# Patient Record
Sex: FEMALE | Race: BLACK OR AFRICAN AMERICAN | Employment: FULL TIME | URBAN - METROPOLITAN AREA
[De-identification: names, ages, dates, MRNs, and addresses within clinical notes are randomized per-mention and may not be internally consistent; named-entity substitution may affect disease eponyms.]

---

## 2019-01-27 ENCOUNTER — HOSPITAL ENCOUNTER (EMERGENCY)
Age: 39
Discharge: HOME | End: 2019-01-27
Attending: EMERGENCY MEDICINE
Payer: COMMERCIAL

## 2019-01-27 ENCOUNTER — APPOINTMENT (OUTPATIENT)
Dept: CT IMAGING | Age: 39
End: 2019-01-27
Attending: EMERGENCY MEDICINE
Payer: COMMERCIAL

## 2019-01-27 ENCOUNTER — APPOINTMENT (OUTPATIENT)
Dept: GENERAL RADIOLOGY | Age: 39
End: 2019-01-27
Attending: EMERGENCY MEDICINE
Payer: COMMERCIAL

## 2019-01-27 VITALS
SYSTOLIC BLOOD PRESSURE: 135 MMHG | WEIGHT: 291.9 LBS | RESPIRATION RATE: 20 BRPM | DIASTOLIC BLOOD PRESSURE: 80 MMHG | TEMPERATURE: 98.7 F | OXYGEN SATURATION: 96 % | HEART RATE: 105 BPM | HEIGHT: 64 IN | BODY MASS INDEX: 49.83 KG/M2

## 2019-01-27 DIAGNOSIS — J20.9 ACUTE BRONCHITIS, UNSPECIFIED ORGANISM: Primary | ICD-10-CM

## 2019-01-27 LAB
ALBUMIN SERPL-MCNC: 3.5 G/DL (ref 3.5–5)
ALBUMIN/GLOB SERPL: 0.8 {RATIO}
ALP SERPL-CCNC: 85 U/L (ref 50–130)
ALT SERPL-CCNC: 34 U/L (ref 12–65)
ANION GAP SERPL CALC-SCNC: 8 MMOL/L
AST SERPL-CCNC: 24 U/L (ref 15–37)
BASOPHILS # BLD: 0 K/UL (ref 0–0.2)
BASOPHILS NFR BLD: 0 % (ref 0–2)
BILIRUB SERPL-MCNC: 0.1 MG/DL (ref 0.2–1.1)
BUN SERPL-MCNC: 4 MG/DL (ref 6–23)
CALCIUM SERPL-MCNC: 9.1 MG/DL (ref 8.3–10.4)
CHLORIDE SERPL-SCNC: 106 MMOL/L (ref 98–107)
CO2 SERPL-SCNC: 28 MMOL/L (ref 21–32)
CREAT SERPL-MCNC: 0.81 MG/DL (ref 0.6–1)
DIFFERENTIAL METHOD BLD: ABNORMAL
EOSINOPHIL # BLD: 0.2 K/UL (ref 0–0.8)
EOSINOPHIL NFR BLD: 2 % (ref 0.5–7.8)
ERYTHROCYTE [DISTWIDTH] IN BLOOD BY AUTOMATED COUNT: 14 % (ref 11.9–14.6)
FLUAV AG NPH QL IA: NEGATIVE
FLUBV AG NPH QL IA: NEGATIVE
GLOBULIN SER CALC-MCNC: 4.5 G/DL (ref 2.3–3.5)
GLUCOSE SERPL-MCNC: 109 MG/DL (ref 65–100)
HCT VFR BLD AUTO: 40.9 % (ref 35.8–46.3)
HGB BLD-MCNC: 13.4 G/DL (ref 11.7–15.4)
IMM GRANULOCYTES # BLD AUTO: 0 K/UL (ref 0–0.5)
IMM GRANULOCYTES NFR BLD AUTO: 0 % (ref 0–5)
LYMPHOCYTES # BLD: 0.7 K/UL (ref 0.5–4.6)
LYMPHOCYTES NFR BLD: 8 % (ref 13–44)
MCH RBC QN AUTO: 27.1 PG (ref 26.1–32.9)
MCHC RBC AUTO-ENTMCNC: 32.8 G/DL (ref 31.4–35)
MCV RBC AUTO: 82.6 FL (ref 79.6–97.8)
MONOCYTES # BLD: 0.9 K/UL (ref 0.1–1.3)
MONOCYTES NFR BLD: 11 % (ref 4–12)
NEUTS SEG # BLD: 6.9 K/UL (ref 1.7–8.2)
NEUTS SEG NFR BLD: 80 % (ref 43–78)
NRBC # BLD: 0 K/UL (ref 0–0.2)
PLATELET # BLD AUTO: 282 K/UL (ref 150–450)
PMV BLD AUTO: 11.2 FL (ref 9.4–12.3)
POTASSIUM SERPL-SCNC: 4 MMOL/L (ref 3.5–5.1)
PROT SERPL-MCNC: 8 G/DL
RBC # BLD AUTO: 4.95 M/UL (ref 4.05–5.2)
SODIUM SERPL-SCNC: 142 MMOL/L (ref 136–145)
SPECIMEN SOURCE: NORMAL
WBC # BLD AUTO: 8.6 K/UL (ref 4.3–11.1)

## 2019-01-27 PROCEDURE — 74011000250 HC RX REV CODE- 250: Performed by: EMERGENCY MEDICINE

## 2019-01-27 PROCEDURE — 80053 COMPREHEN METABOLIC PANEL: CPT

## 2019-01-27 PROCEDURE — 71045 X-RAY EXAM CHEST 1 VIEW: CPT

## 2019-01-27 PROCEDURE — 77030013140 HC MSK NEB VYRM -A

## 2019-01-27 PROCEDURE — 74011636320 HC RX REV CODE- 636/320: Performed by: EMERGENCY MEDICINE

## 2019-01-27 PROCEDURE — 87804 INFLUENZA ASSAY W/OPTIC: CPT

## 2019-01-27 PROCEDURE — 96374 THER/PROPH/DIAG INJ IV PUSH: CPT | Performed by: EMERGENCY MEDICINE

## 2019-01-27 PROCEDURE — 93005 ELECTROCARDIOGRAM TRACING: CPT | Performed by: EMERGENCY MEDICINE

## 2019-01-27 PROCEDURE — 96375 TX/PRO/DX INJ NEW DRUG ADDON: CPT | Performed by: EMERGENCY MEDICINE

## 2019-01-27 PROCEDURE — 99284 EMERGENCY DEPT VISIT MOD MDM: CPT | Performed by: EMERGENCY MEDICINE

## 2019-01-27 PROCEDURE — 74011000258 HC RX REV CODE- 258: Performed by: EMERGENCY MEDICINE

## 2019-01-27 PROCEDURE — 74011250636 HC RX REV CODE- 250/636: Performed by: EMERGENCY MEDICINE

## 2019-01-27 PROCEDURE — 74011250637 HC RX REV CODE- 250/637: Performed by: EMERGENCY MEDICINE

## 2019-01-27 PROCEDURE — 85025 COMPLETE CBC W/AUTO DIFF WBC: CPT

## 2019-01-27 PROCEDURE — 71260 CT THORAX DX C+: CPT

## 2019-01-27 PROCEDURE — 94640 AIRWAY INHALATION TREATMENT: CPT

## 2019-01-27 PROCEDURE — 77030012341 HC CHMB SPCR OPTC MDI VYRM -A

## 2019-01-27 RX ORDER — ACETAMINOPHEN 500 MG
1000 TABLET ORAL
Status: COMPLETED | OUTPATIENT
Start: 2019-01-27 | End: 2019-01-27

## 2019-01-27 RX ORDER — MORPHINE SULFATE 2 MG/ML
2 INJECTION, SOLUTION INTRAMUSCULAR; INTRAVENOUS
Status: DISCONTINUED | OUTPATIENT
Start: 2019-01-27 | End: 2019-01-27

## 2019-01-27 RX ORDER — SODIUM CHLORIDE 0.9 % (FLUSH) 0.9 %
10 SYRINGE (ML) INJECTION
Status: COMPLETED | OUTPATIENT
Start: 2019-01-27 | End: 2019-01-27

## 2019-01-27 RX ORDER — MORPHINE SULFATE 10 MG/ML
2 INJECTION, SOLUTION INTRAMUSCULAR; INTRAVENOUS
Status: COMPLETED | OUTPATIENT
Start: 2019-01-27 | End: 2019-01-27

## 2019-01-27 RX ORDER — IPRATROPIUM BROMIDE AND ALBUTEROL SULFATE 2.5; .5 MG/3ML; MG/3ML
3 SOLUTION RESPIRATORY (INHALATION)
Status: COMPLETED | OUTPATIENT
Start: 2019-01-27 | End: 2019-01-27

## 2019-01-27 RX ORDER — KETOROLAC TROMETHAMINE 30 MG/ML
30 INJECTION, SOLUTION INTRAMUSCULAR; INTRAVENOUS
Status: COMPLETED | OUTPATIENT
Start: 2019-01-27 | End: 2019-01-27

## 2019-01-27 RX ADMIN — IOPAMIDOL 100 ML: 755 INJECTION, SOLUTION INTRAVENOUS at 22:49

## 2019-01-27 RX ADMIN — MORPHINE SULFATE 2 MG: 10 INJECTION INTRAVENOUS at 23:05

## 2019-01-27 RX ADMIN — SODIUM CHLORIDE 1000 ML: 900 INJECTION, SOLUTION INTRAVENOUS at 20:52

## 2019-01-27 RX ADMIN — ACETAMINOPHEN 1000 MG: 500 TABLET, FILM COATED ORAL at 20:52

## 2019-01-27 RX ADMIN — Medication 10 ML: at 22:49

## 2019-01-27 RX ADMIN — SODIUM CHLORIDE 100 ML: 900 INJECTION, SOLUTION INTRAVENOUS at 22:49

## 2019-01-27 RX ADMIN — IPRATROPIUM BROMIDE AND ALBUTEROL SULFATE 3 ML: .5; 3 SOLUTION RESPIRATORY (INHALATION) at 19:58

## 2019-01-27 RX ADMIN — KETOROLAC TROMETHAMINE 30 MG: 30 INJECTION, SOLUTION INTRAMUSCULAR at 23:05

## 2019-01-27 NOTE — LETTER
[address] [facility] EMERGENCY DEPT 
[facility] [address] [facility] [address] 
[phone] Work/School Note Date: 1/27/2019 To Whom It May concern: 
 
Terry Wan was seen and treated today in the emergency room by the following provider(s): 
No providers found. Terry Wan may return to work on 01/29/2019.  
 
Sincerely, 
 
 
 
 
Casper Matson

## 2019-01-28 LAB
ATRIAL RATE: 137 BPM
CALCULATED P AXIS, ECG09: 52 DEGREES
CALCULATED R AXIS, ECG10: 32 DEGREES
CALCULATED T AXIS, ECG11: 46 DEGREES
DIAGNOSIS, 93000: NORMAL
P-R INTERVAL, ECG05: 126 MS
Q-T INTERVAL, ECG07: 292 MS
QRS DURATION, ECG06: 68 MS
QTC CALCULATION (BEZET), ECG08: 440 MS
VENTRICULAR RATE, ECG03: 137 BPM

## 2019-01-28 NOTE — DISCHARGE INSTRUCTIONS
Take your medications as prescribed  Alternate between Tylenol 1000 mg and ibuprofen 800 mg every 4 hours as needed for fever  Drink plenty of fluids  Follow-up with your primary care physician    Bronchitis: Care Instructions  Your Care Instructions    Bronchitis is inflammation of the bronchial tubes, which carry air to the lungs. The tubes swell and produce mucus, or phlegm. The mucus and inflamed bronchial tubes make you cough. You may have trouble breathing. Most cases of bronchitis are caused by viruses like those that cause colds. Antibiotics usually do not help and they may be harmful. Bronchitis usually develops rapidly and lasts about 2 to 3 weeks in otherwise healthy people. Follow-up care is a key part of your treatment and safety. Be sure to make and go to all appointments, and call your doctor if you are having problems. It's also a good idea to know your test results and keep a list of the medicines you take. How can you care for yourself at home? · Take all medicines exactly as prescribed. Call your doctor if you think you are having a problem with your medicine. · Get some extra rest.  · Take an over-the-counter pain medicine, such as acetaminophen (Tylenol), ibuprofen (Advil, Motrin), or naproxen (Aleve) to reduce fever and relieve body aches. Read and follow all instructions on the label. · Do not take two or more pain medicines at the same time unless the doctor told you to. Many pain medicines have acetaminophen, which is Tylenol. Too much acetaminophen (Tylenol) can be harmful. · Take an over-the-counter cough medicine that contains dextromethorphan to help quiet a dry, hacking cough so that you can sleep. Avoid cough medicines that have more than one active ingredient. Read and follow all instructions on the label. · Breathe moist air from a humidifier, hot shower, or sink filled with hot water. The heat and moisture will thin mucus so you can cough it out. · Do not smoke. Smoking can make bronchitis worse. If you need help quitting, talk to your doctor about stop-smoking programs and medicines. These can increase your chances of quitting for good. When should you call for help? Call 911 anytime you think you may need emergency care. For example, call if:    · You have severe trouble breathing.    Call your doctor now or seek immediate medical care if:    · You have new or worse trouble breathing.     · You cough up dark brown or bloody mucus (sputum).     · You have a new or higher fever.     · You have a new rash.    Watch closely for changes in your health, and be sure to contact your doctor if:    · You cough more deeply or more often, especially if you notice more mucus or a change in the color of your mucus.     · You are not getting better as expected. Where can you learn more? Go to http://anil-cici.info/. Enter H333 in the search box to learn more about \"Bronchitis: Care Instructions. \"  Current as of: September 5, 2018  Content Version: 11.9  © 3331-0908 [employer]. Care instructions adapted under license by [employer] (which disclaims liability or warranty for this information). If you have questions about a medical condition or this instruction, always ask your healthcare professional. [address] any warranty or liability for your use of this information.

## 2019-01-28 NOTE — ED PROVIDER NOTES
Patient presents to ER complaining of fevers, cough, shortness of breath. Symptoms started earlier last evening. Reports she was seen at urgent care today and diagnosed with bronchitis. She has no history of asthma. Reports symptoms worsened throughout today to include fevers and chills. She denies any vomiting The history is provided by the patient. Shortness of Breath This is a new problem. The problem occurs frequently. The current episode started yesterday. Associated symptoms include a fever and cough. Pertinent negatives include no headaches, no sputum production, no hemoptysis, no chest pain, no vomiting, no abdominal pain, no rash and no leg pain. No past medical history on file. No past surgical history on file. No family history on file. Social History Socioeconomic History  Marital status: SINGLE Spouse name: Not on file  Number of children: Not on file  Years of education: Not on file  Highest education level: Not on file Social Needs  Financial resource strain: Not on file  Food insecurity - worry: Not on file  Food insecurity - inability: Not on file  Transportation needs - medical: Not on file  Transportation needs - non-medical: Not on file Occupational History  Not on file Tobacco Use  Smoking status: Not on file Substance and Sexual Activity  Alcohol use: Not on file  Drug use: Not on file  Sexual activity: Not on file Other Topics Concern  Not on file Social History Narrative  Not on file ALLERGIES: Latex Review of Systems Constitutional: Positive for fever. HENT: Positive for congestion. Eyes: Negative for photophobia, redness and visual disturbance. Respiratory: Positive for cough and shortness of breath. Negative for hemoptysis, sputum production and chest tightness. Cardiovascular: Negative for chest pain. Gastrointestinal: Negative for abdominal pain, nausea and vomiting. Endocrine: Negative for polydipsia, polyphagia and polyuria. Genitourinary: Negative for flank pain, frequency and urgency. Musculoskeletal: Negative for back pain. Skin: Negative for rash. Neurological: Positive for weakness. Negative for seizures, syncope, numbness and headaches. Psychiatric/Behavioral: Negative for behavioral problems and confusion. All other systems reviewed and are negative. Vitals:  
 01/27/19 1941 01/27/19 1958 BP: 137/87 Pulse: (!) 144 Resp: 26 Temp: 100.4 °F (38 °C) SpO2: 95% 95% Weight: 132.4 kg (291 lb 14.4 oz) Height: 5' 4\" (1.626 m) Physical Exam  
Constitutional: She is oriented to person, place, and time. She appears well-developed and well-nourished. HENT:  
Head: Normocephalic and atraumatic. Eyes: Conjunctivae and EOM are normal. Pupils are equal, round, and reactive to light. Neck: Normal range of motion. Neck supple. No tracheal deviation present. No thyromegaly present. Cardiovascular: Normal heart sounds. Tachycardia present. Pulmonary/Chest: Effort normal. She has wheezes. Abdominal: Soft. Bowel sounds are normal.  
Musculoskeletal: Normal range of motion. She exhibits no edema or deformity. Neurological: She is alert and oriented to person, place, and time. No cranial nerve deficit. Nursing note and vitals reviewed. MDM Number of Diagnoses or Management Options Diagnosis management comments: We'll obtain chest x-ray, obtain basic labs 10:14 PM 
Flu swab negative. Chest x-ray read as normal.  Patient still with some tachycardia here as well as some mild hypoxia. We'll obtain CT scan of chest to rule out PE 
 
11:02 PM 
CT of chest shows no acute abnormalities. Discussed with patient. Results of testing. We'll have her continue her medications as prescribed Amount and/or Complexity of Data Reviewed Clinical lab tests: ordered and reviewed Tests in the radiology section of CPT®: ordered and reviewed Risk of Complications, Morbidity, and/or Mortality Presenting problems: high Diagnostic procedures: moderate Management options: moderate Patient Progress Patient progress: stable Procedures Results Include: 
 
Recent Results (from the past 24 hour(s)) INFLUENZA A & B AG (RAPID TEST) Collection Time: 01/27/19  7:48 PM  
Result Value Ref Range Influenza A Ag NEGATIVE  NEG Influenza B Ag NEGATIVE  NEG Source NASOPHARYNGEAL    
CBC WITH AUTOMATED DIFF Collection Time: 01/27/19  8:01 PM  
Result Value Ref Range WBC 8.6 4.3 - 11.1 K/uL  
 RBC 4.95 4.05 - 5.2 M/uL  
 HGB 13.4 11.7 - 15.4 g/dL HCT 40.9 35.8 - 46.3 % MCV 82.6 79.6 - 97.8 FL  
 MCH 27.1 26.1 - 32.9 PG  
 MCHC 32.8 31.4 - 35.0 g/dL  
 RDW 14.0 11.9 - 14.6 % PLATELET 738 861 - 782 K/uL MPV 11.2 9.4 - 12.3 FL ABSOLUTE NRBC 0.00 0.0 - 0.2 K/uL  
 DF AUTOMATED NEUTROPHILS 80 (H) 43 - 78 % LYMPHOCYTES 8 (L) 13 - 44 % MONOCYTES 11 4.0 - 12.0 % EOSINOPHILS 2 0.5 - 7.8 % BASOPHILS 0 0.0 - 2.0 % IMMATURE GRANULOCYTES 0 0.0 - 5.0 %  
 ABS. NEUTROPHILS 6.9 1.7 - 8.2 K/UL  
 ABS. LYMPHOCYTES 0.7 0.5 - 4.6 K/UL  
 ABS. MONOCYTES 0.9 0.1 - 1.3 K/UL  
 ABS. EOSINOPHILS 0.2 0.0 - 0.8 K/UL  
 ABS. BASOPHILS 0.0 0.0 - 0.2 K/UL  
 ABS. IMM. GRANS. 0.0 0.0 - 0.5 K/UL METABOLIC PANEL, COMPREHENSIVE Collection Time: 01/27/19  8:01 PM  
Result Value Ref Range Sodium 142 136 - 145 mmol/L Potassium 4.0 3.5 - 5.1 mmol/L Chloride 106 98 - 107 mmol/L  
 CO2 28 21 - 32 mmol/L Anion gap 8 mmol/L Glucose 109 (H) 65 - 100 mg/dL BUN 4 (L) 6 - 23 MG/DL Creatinine 0.81 0.6 - 1.0 MG/DL  
 GFR est AA >60 >60 ml/min/1.73m2 GFR est non-AA >60 ml/min/1.73m2 Calcium 9.1 8.3 - 10.4 MG/DL  Bilirubin, total 0.1 (L) 0.2 - 1.1 MG/DL  
 ALT (SGPT) 34 12 - 65 U/L  
 AST (SGOT) 24 15 - 37 U/L Alk. phosphatase 85 50 - 130 U/L Protein, total 8.0 g/dL Albumin 3.5 3.5 - 5.0 g/dL Globulin 4.5 (H) 2.3 - 3.5 g/dL A-G Ratio 0.8 Voice dictation software was used during the making of this note. This software is not perfect and grammatical and other typographical errors may be present. This note has been proofread, but may still contain errors.  
Renae Mazariegos MD; 1/27/2019 @11:02 PM  
===================================================================

## 2019-04-12 VITALS — HEIGHT: 66 IN | BODY MASS INDEX: 47.09 KG/M2 | WEIGHT: 293 LBS

## 2019-04-12 NOTE — H&P (VIEW-ONLY)
History and Physical 
 
Patient: Wily Lopez MRN: [id number]  SSN: [id number] YOB: 1980  Age: 43 y.o. Sex: female Vital Signs: /87 P122 R20 SPO2 96% Visit Vitals Ht 5' 6\" (1.676 m) Wt 133.8 kg (295 lb) BMI 47.61 kg/m² Allergies: Allergies Allergen Reactions  Latex Hives Chief Complaint: back, neck, and shoulder pain History of Present Illness: back, neck, and shoulder pain Justification for Procedure: back, neck, and shoulder pain History: 
Past Medical History:  
Diagnosis Date  Arthritis   
 osteo  Asthma   
 last attack 10/2018  Chronic pain  GERD (gastroesophageal reflux disease)   
 controlled with medication  Insomnia  Low back pain  Migraines  Morbid obesity ([address]  Otitis media  Sleep apnea   
 does not use cpap Past Surgical History:  
Procedure Laterality Date  HX CHOLECYSTECTOMY  2015  HX CATHY AND BSO  2016  HX WISDOM TEETH EXTRACTION Social History Socioeconomic History  Marital status: SINGLE Spouse name: Not on file  Number of children: Not on file  Years of education: Not on file  Highest education level: Not on file Tobacco Use  Smoking status: Never Smoker  Smokeless tobacco: Never Used Substance and Sexual Activity  Alcohol use: Yes Alcohol/week: 0.6 oz Types: 1 Glasses of wine per week  Drug use: Never Family History Problem Relation Age of Onset  Heart Attack Father  Cancer Sister   
     brain ca  No Known Problems Brother  No Known Problems Sister  No Known Problems Brother Medications:  
Prior to Admission medications Medication Sig Start Date End Date Taking? Authorizing Provider  
eletriptan hydrobromide (RELPAX PO) Take  by mouth. Provider, Historical  
ketorolac (TORADOL) 10 mg tablet Take  by mouth as needed for Pain.     Provider, Historical  
 montelukast (SINGULAIR) 10 mg tablet Take 10 mg by mouth daily. Provider, Historical  
albuterol (VENTOLIN HFA) 90 mcg/actuation inhaler Take  by inhalation. Provider, Historical  
meloxicam (MOBIC) 15 mg tablet Take 15 mg by mouth daily. Provider, Historical  
cyclobenzaprine (FLEXERIL) 10 mg tablet Take  by mouth three (3) times daily as needed for Muscle Spasm(s). Provider, Historical  
Cetirizine (ZYRTEC) 10 mg cap Take  by mouth. Provider, Historical  
albuterol sulfate (PROVENTIL;VENTOLIN) 2.5 mg/0.5 mL nebu nebulizer solution by Nebulization route once. Provider, Historical  
fluticasone propionate (FLOVENT HFA) 220 mcg/actuation inhaler Take  by inhalation. Provider, Historical  
Omeprazole delayed release (PRILOSEC D/R) 20 mg tablet Take 20 mg by mouth daily. Provider, Historical  
traZODone (DESYREL) 50 mg tablet Take  by mouth nightly. Provider, Historical  
 
 
Physical Exam:  
Heart: regular rate and rhythm, S1, S2 normal, no murmur, click, rub or gallop Lungs: clear to auscultation bilaterally Surgical Site: bilateral breasts Findings/Diagnosis: macromastia Plan of Care/Planned Procedure: bilateral breast reduction Signed By: Thor Simon MD  
 April 12, 2019

## 2019-04-12 NOTE — H&P
History and Physical    Patient: Wily Lopez MRN: [id number]  SSN: [id number]    YOB: 1980  Age: [id number] [facility] y.o. Sex: female      Vital Signs: /87 P122 R20 SPO2 96%  Visit Vitals  Ht 5' 6\" (1.676 m)   Wt 133.8 kg (295 lb)   BMI 47.61 kg/m²        Allergies: Allergies   Allergen Reactions    Latex Hives       Chief Complaint: back, neck, and shoulder pain     History of Present Illness: back, neck, and shoulder pain     Justification for Procedure: back, neck, and shoulder pain     History:  Past Medical History:   Diagnosis Date    Arthritis     osteo    Asthma     last attack 10/2018    Chronic pain     GERD (gastroesophageal reflux disease)     controlled with medication    Insomnia     Low back pain     Migraines     Morbid obesity ([address]     Otitis media     Sleep apnea     does not use cpap      Past Surgical History:   Procedure Laterality Date    HX CHOLECYSTECTOMY  2015    HX CATHY AND BSO  2016    HX WISDOM TEETH EXTRACTION        Social History     Socioeconomic History    Marital status: SINGLE     Spouse name: Not on file    Number of children: Not on file    Years of education: Not on file    Highest education level: Not on file   Tobacco Use    Smoking status: Never Smoker    Smokeless tobacco: Never Used   Substance and Sexual Activity    Alcohol use: Yes     Alcohol/week: 0.6 oz     Types: 1 Glasses of wine per week    Drug use: Never      Family History   Problem Relation Age of Onset    Heart Attack Father     Cancer Sister         brain ca    No Known Problems Brother     No Known Problems Sister     No Known Problems Brother        Medications:   Prior to Admission medications    Medication Sig Start Date End Date Taking? Authorizing Provider   eletriptan hydrobromide (RELPAX PO) Take  by mouth. Provider, Historical   ketorolac (TORADOL) 10 mg tablet Take  by mouth as needed for Pain.     Provider, Historical   montelukast (SINGULAIR) 10 mg tablet Take 10 mg by mouth daily. Provider, Historical   albuterol (VENTOLIN HFA) 90 mcg/actuation inhaler Take  by inhalation. Provider, Historical   meloxicam (MOBIC) 15 mg tablet Take 15 mg by mouth daily. Provider, Historical   cyclobenzaprine (FLEXERIL) 10 mg tablet Take  by mouth three (3) times daily as needed for Muscle Spasm(s). Provider, Historical   Cetirizine (ZYRTEC) 10 mg cap Take  by mouth. Provider, Historical   albuterol sulfate (PROVENTIL;VENTOLIN) 2.5 mg/0.5 mL nebu nebulizer solution by Nebulization route once. Provider, Historical   fluticasone propionate (FLOVENT HFA) 220 mcg/actuation inhaler Take  by inhalation. Provider, Historical   Omeprazole delayed release (PRILOSEC D/R) 20 mg tablet Take 20 mg by mouth daily. Provider, Historical   traZODone (DESYREL) 50 mg tablet Take  by mouth nightly.     Provider, Historical       Physical Exam:   Heart: regular rate and rhythm, S1, S2 normal, no murmur, click, rub or gallop   Lungs: clear to auscultation bilaterally   Surgical Site: bilateral breasts     Findings/Diagnosis: macromastia    Plan of Care/Planned Procedure: bilateral breast reduction    Signed By: Raheem Bowser MD    April 12, 2019

## 2019-04-15 ENCOUNTER — ANESTHESIA (OUTPATIENT)
Dept: SURGERY | Age: 39
End: 2019-04-15
Payer: COMMERCIAL

## 2019-04-15 ENCOUNTER — ANESTHESIA EVENT (OUTPATIENT)
Dept: SURGERY | Age: 39
End: 2019-04-15
Payer: COMMERCIAL

## 2019-04-15 ENCOUNTER — HOSPITAL ENCOUNTER (OUTPATIENT)
Age: 39
Setting detail: OUTPATIENT SURGERY
Discharge: HOME | End: 2019-04-15
Attending: SURGERY | Admitting: SURGERY
Payer: COMMERCIAL

## 2019-04-15 VITALS
BODY MASS INDEX: 48.7 KG/M2 | TEMPERATURE: 98 F | HEART RATE: 103 BPM | SYSTOLIC BLOOD PRESSURE: 116 MMHG | WEIGHT: 293 LBS | RESPIRATION RATE: 16 BRPM | OXYGEN SATURATION: 97 % | DIASTOLIC BLOOD PRESSURE: 76 MMHG

## 2019-04-15 DIAGNOSIS — G89.18 POST-OP PAIN: Primary | ICD-10-CM

## 2019-04-15 LAB — HGB BLD-MCNC: 13 G/DL (ref 11.7–15.4)

## 2019-04-15 PROCEDURE — 74011250636 HC RX REV CODE- 250/636: Performed by: SURGERY

## 2019-04-15 PROCEDURE — 76210000063 HC OR PH I REC FIRST 0.5 HR: Performed by: SURGERY

## 2019-04-15 PROCEDURE — 77030019908 HC STETH ESOPH SIMS -A: Performed by: ANESTHESIOLOGY

## 2019-04-15 PROCEDURE — 77030020782 HC GWN BAIR PAWS FLX 3M -B: Performed by: ANESTHESIOLOGY

## 2019-04-15 PROCEDURE — 74011250637 HC RX REV CODE- 250/637: Performed by: ANESTHESIOLOGY

## 2019-04-15 PROCEDURE — 77030002991 HC SUT QUILL SSPC -B: Performed by: SURGERY

## 2019-04-15 PROCEDURE — 76210000020 HC REC RM PH II FIRST 0.5 HR: Performed by: SURGERY

## 2019-04-15 PROCEDURE — 74011250636 HC RX REV CODE- 250/636

## 2019-04-15 PROCEDURE — 77030018836 HC SOL IRR NACL ICUM -A: Performed by: SURGERY

## 2019-04-15 PROCEDURE — 77030028700 HC BLD TISS PLSM MEDT -E: Performed by: SURGERY

## 2019-04-15 PROCEDURE — 77030033138 HC SUT PGA STRATFX J&J -B: Performed by: SURGERY

## 2019-04-15 PROCEDURE — 77030028234 HC DEV PEAK PLSMBLD MEDT -B: Performed by: SURGERY

## 2019-04-15 PROCEDURE — 77030002933 HC SUT MCRYL J&J -A: Performed by: SURGERY

## 2019-04-15 PROCEDURE — 77030008467 HC STPLR SKN COVD -B: Performed by: SURGERY

## 2019-04-15 PROCEDURE — 77030039425 HC BLD LARYNG TRULITE DISP TELE -A: Performed by: ANESTHESIOLOGY

## 2019-04-15 PROCEDURE — 76010000173 HC OR TIME 3 TO 3.5 HR INTENSV-TIER 1: Performed by: SURGERY

## 2019-04-15 PROCEDURE — 74011250636 HC RX REV CODE- 250/636: Performed by: ANESTHESIOLOGY

## 2019-04-15 PROCEDURE — 77030011283 HC ELECTRD NDL COVD -A: Performed by: SURGERY

## 2019-04-15 PROCEDURE — 77030037088 HC TUBE ENDOTRACH ORAL NSL COVD-A: Performed by: ANESTHESIOLOGY

## 2019-04-15 PROCEDURE — 74011000250 HC RX REV CODE- 250

## 2019-04-15 PROCEDURE — 88307 TISSUE EXAM BY PATHOLOGIST: CPT

## 2019-04-15 PROCEDURE — 77030002986 HC SUT PROL J&J -A: Performed by: SURGERY

## 2019-04-15 PROCEDURE — 77030032490 HC SLV COMPR SCD KNE COVD -B: Performed by: SURGERY

## 2019-04-15 PROCEDURE — 77030006625 HC BLD GRFT DRMTO INLC -C: Performed by: SURGERY

## 2019-04-15 PROCEDURE — 77030033067 HC SUT PDO STRATFX SPIR J&J -B: Performed by: SURGERY

## 2019-04-15 PROCEDURE — 85018 HEMOGLOBIN: CPT

## 2019-04-15 PROCEDURE — 76060000037 HC ANESTHESIA 3 TO 3.5 HR: Performed by: SURGERY

## 2019-04-15 PROCEDURE — 77030002916 HC SUT ETHLN J&J -A: Performed by: SURGERY

## 2019-04-15 RX ORDER — HYDROCODONE BITARTRATE AND ACETAMINOPHEN 5; 325 MG/1; MG/1
2 TABLET ORAL AS NEEDED
Status: DISCONTINUED | OUTPATIENT
Start: 2019-04-15 | End: 2019-04-15 | Stop reason: HOSPADM

## 2019-04-15 RX ORDER — HYDROMORPHONE HYDROCHLORIDE 2 MG/ML
0.5 INJECTION, SOLUTION INTRAMUSCULAR; INTRAVENOUS; SUBCUTANEOUS
Status: DISCONTINUED | OUTPATIENT
Start: 2019-04-15 | End: 2019-04-15 | Stop reason: HOSPADM

## 2019-04-15 RX ORDER — FENTANYL CITRATE 50 UG/ML
INJECTION, SOLUTION INTRAMUSCULAR; INTRAVENOUS AS NEEDED
Status: DISCONTINUED | OUTPATIENT
Start: 2019-04-15 | End: 2019-04-15 | Stop reason: HOSPADM

## 2019-04-15 RX ORDER — NEOSTIGMINE METHYLSULFATE 1 MG/ML
INJECTION INTRAVENOUS AS NEEDED
Status: DISCONTINUED | OUTPATIENT
Start: 2019-04-15 | End: 2019-04-15 | Stop reason: HOSPADM

## 2019-04-15 RX ORDER — FENTANYL CITRATE 50 UG/ML
100 INJECTION, SOLUTION INTRAMUSCULAR; INTRAVENOUS ONCE
Status: DISCONTINUED | OUTPATIENT
Start: 2019-04-15 | End: 2019-04-15 | Stop reason: HOSPADM

## 2019-04-15 RX ORDER — ONDANSETRON 2 MG/ML
INJECTION INTRAMUSCULAR; INTRAVENOUS AS NEEDED
Status: DISCONTINUED | OUTPATIENT
Start: 2019-04-15 | End: 2019-04-15 | Stop reason: HOSPADM

## 2019-04-15 RX ORDER — OXYCODONE AND ACETAMINOPHEN 10; 325 MG/1; MG/1
1 TABLET ORAL
Qty: 20 TAB | Refills: 0 | Status: SHIPPED | OUTPATIENT
Start: 2019-04-15 | End: 2019-04-18

## 2019-04-15 RX ORDER — PROPOFOL 10 MG/ML
INJECTION, EMULSION INTRAVENOUS AS NEEDED
Status: DISCONTINUED | OUTPATIENT
Start: 2019-04-15 | End: 2019-04-15 | Stop reason: HOSPADM

## 2019-04-15 RX ORDER — DEXAMETHASONE SODIUM PHOSPHATE 4 MG/ML
INJECTION, SOLUTION INTRA-ARTICULAR; INTRALESIONAL; INTRAMUSCULAR; INTRAVENOUS; SOFT TISSUE AS NEEDED
Status: DISCONTINUED | OUTPATIENT
Start: 2019-04-15 | End: 2019-04-15 | Stop reason: HOSPADM

## 2019-04-15 RX ORDER — OXYCODONE HYDROCHLORIDE 5 MG/1
10 TABLET ORAL
Status: COMPLETED | OUTPATIENT
Start: 2019-04-15 | End: 2019-04-15

## 2019-04-15 RX ORDER — LIDOCAINE HYDROCHLORIDE 20 MG/ML
INJECTION, SOLUTION EPIDURAL; INFILTRATION; INTRACAUDAL; PERINEURAL AS NEEDED
Status: DISCONTINUED | OUTPATIENT
Start: 2019-04-15 | End: 2019-04-15 | Stop reason: HOSPADM

## 2019-04-15 RX ORDER — SODIUM CHLORIDE, SODIUM LACTATE, POTASSIUM CHLORIDE, CALCIUM CHLORIDE 600; 310; 30; 20 MG/100ML; MG/100ML; MG/100ML; MG/100ML
75 INJECTION, SOLUTION INTRAVENOUS CONTINUOUS
Status: DISCONTINUED | OUTPATIENT
Start: 2019-04-15 | End: 2019-04-15 | Stop reason: HOSPADM

## 2019-04-15 RX ORDER — HYDROMORPHONE HYDROCHLORIDE 2 MG/ML
0.5 INJECTION, SOLUTION INTRAMUSCULAR; INTRAVENOUS; SUBCUTANEOUS
Status: COMPLETED | OUTPATIENT
Start: 2019-04-15 | End: 2019-04-15

## 2019-04-15 RX ORDER — LIDOCAINE HYDROCHLORIDE 10 MG/ML
0.1 INJECTION INFILTRATION; PERINEURAL AS NEEDED
Status: DISCONTINUED | OUTPATIENT
Start: 2019-04-15 | End: 2019-04-15 | Stop reason: HOSPADM

## 2019-04-15 RX ORDER — OXYCODONE HYDROCHLORIDE 5 MG/1
5 TABLET ORAL
Status: DISCONTINUED | OUTPATIENT
Start: 2019-04-15 | End: 2019-04-15 | Stop reason: HOSPADM

## 2019-04-15 RX ORDER — METOPROLOL TARTRATE 5 MG/5ML
INJECTION INTRAVENOUS AS NEEDED
Status: DISCONTINUED | OUTPATIENT
Start: 2019-04-15 | End: 2019-04-15 | Stop reason: HOSPADM

## 2019-04-15 RX ORDER — MIDAZOLAM HYDROCHLORIDE 1 MG/ML
2 INJECTION, SOLUTION INTRAMUSCULAR; INTRAVENOUS
Status: COMPLETED | OUTPATIENT
Start: 2019-04-15 | End: 2019-04-15

## 2019-04-15 RX ORDER — ROCURONIUM BROMIDE 10 MG/ML
INJECTION, SOLUTION INTRAVENOUS AS NEEDED
Status: DISCONTINUED | OUTPATIENT
Start: 2019-04-15 | End: 2019-04-15 | Stop reason: HOSPADM

## 2019-04-15 RX ORDER — GLYCOPYRROLATE 0.2 MG/ML
INJECTION INTRAMUSCULAR; INTRAVENOUS AS NEEDED
Status: DISCONTINUED | OUTPATIENT
Start: 2019-04-15 | End: 2019-04-15 | Stop reason: HOSPADM

## 2019-04-15 RX ADMIN — ROCURONIUM BROMIDE 5 MG: 10 INJECTION, SOLUTION INTRAVENOUS at 10:35

## 2019-04-15 RX ADMIN — ROCURONIUM BROMIDE 10 MG: 10 INJECTION, SOLUTION INTRAVENOUS at 09:36

## 2019-04-15 RX ADMIN — ROCURONIUM BROMIDE 10 MG: 10 INJECTION, SOLUTION INTRAVENOUS at 10:22

## 2019-04-15 RX ADMIN — MIDAZOLAM HYDROCHLORIDE 2 MG: 1 INJECTION, SOLUTION INTRAMUSCULAR; INTRAVENOUS at 09:04

## 2019-04-15 RX ADMIN — HYDROMORPHONE HYDROCHLORIDE 0.5 MG: 2 INJECTION, SOLUTION INTRAMUSCULAR; INTRAVENOUS; SUBCUTANEOUS at 12:48

## 2019-04-15 RX ADMIN — OXYCODONE HYDROCHLORIDE 10 MG: 5 TABLET ORAL at 13:00

## 2019-04-15 RX ADMIN — ONDANSETRON 4 MG: 2 INJECTION INTRAMUSCULAR; INTRAVENOUS at 11:40

## 2019-04-15 RX ADMIN — ROCURONIUM BROMIDE 15 MG: 10 INJECTION, SOLUTION INTRAVENOUS at 09:56

## 2019-04-15 RX ADMIN — ROCURONIUM BROMIDE 50 MG: 10 INJECTION, SOLUTION INTRAVENOUS at 09:14

## 2019-04-15 RX ADMIN — PROPOFOL 200 MG: 10 INJECTION, EMULSION INTRAVENOUS at 09:14

## 2019-04-15 RX ADMIN — METOPROLOL TARTRATE 2 MG: 5 INJECTION INTRAVENOUS at 09:46

## 2019-04-15 RX ADMIN — GLYCOPYRROLATE 0.3 MG: 0.2 INJECTION INTRAMUSCULAR; INTRAVENOUS at 11:29

## 2019-04-15 RX ADMIN — FENTANYL CITRATE 25 MCG: 50 INJECTION, SOLUTION INTRAMUSCULAR; INTRAVENOUS at 12:01

## 2019-04-15 RX ADMIN — HYDROMORPHONE HYDROCHLORIDE 0.5 MG: 2 INJECTION, SOLUTION INTRAMUSCULAR; INTRAVENOUS; SUBCUTANEOUS at 13:20

## 2019-04-15 RX ADMIN — HYDROMORPHONE HYDROCHLORIDE 0.5 MG: 2 INJECTION, SOLUTION INTRAMUSCULAR; INTRAVENOUS; SUBCUTANEOUS at 13:53

## 2019-04-15 RX ADMIN — HYDROMORPHONE HYDROCHLORIDE 0.5 MG: 2 INJECTION, SOLUTION INTRAMUSCULAR; INTRAVENOUS; SUBCUTANEOUS at 12:53

## 2019-04-15 RX ADMIN — SODIUM CHLORIDE, SODIUM LACTATE, POTASSIUM CHLORIDE, AND CALCIUM CHLORIDE 75 ML/HR: 600; 310; 30; 20 INJECTION, SOLUTION INTRAVENOUS at 07:53

## 2019-04-15 RX ADMIN — HYDROMORPHONE HYDROCHLORIDE 0.5 MG: 2 INJECTION, SOLUTION INTRAMUSCULAR; INTRAVENOUS; SUBCUTANEOUS at 13:10

## 2019-04-15 RX ADMIN — LIDOCAINE HYDROCHLORIDE 100 MG: 20 INJECTION, SOLUTION EPIDURAL; INFILTRATION; INTRACAUDAL; PERINEURAL at 09:14

## 2019-04-15 RX ADMIN — FENTANYL CITRATE 50 MCG: 50 INJECTION, SOLUTION INTRAMUSCULAR; INTRAVENOUS at 09:34

## 2019-04-15 RX ADMIN — FENTANYL CITRATE 100 MCG: 50 INJECTION, SOLUTION INTRAMUSCULAR; INTRAVENOUS at 09:14

## 2019-04-15 RX ADMIN — Medication 3 G: at 09:16

## 2019-04-15 RX ADMIN — ROCURONIUM BROMIDE 10 MG: 10 INJECTION, SOLUTION INTRAVENOUS at 10:06

## 2019-04-15 RX ADMIN — FENTANYL CITRATE 50 MCG: 50 INJECTION, SOLUTION INTRAMUSCULAR; INTRAVENOUS at 10:16

## 2019-04-15 RX ADMIN — HYDROMORPHONE HYDROCHLORIDE 0.5 MG: 2 INJECTION, SOLUTION INTRAMUSCULAR; INTRAVENOUS; SUBCUTANEOUS at 13:02

## 2019-04-15 RX ADMIN — SODIUM CHLORIDE, SODIUM LACTATE, POTASSIUM CHLORIDE, AND CALCIUM CHLORIDE: 600; 310; 30; 20 INJECTION, SOLUTION INTRAVENOUS at 10:55

## 2019-04-15 RX ADMIN — HYDROMORPHONE HYDROCHLORIDE 0.5 MG: 2 INJECTION, SOLUTION INTRAMUSCULAR; INTRAVENOUS; SUBCUTANEOUS at 13:47

## 2019-04-15 RX ADMIN — NEOSTIGMINE METHYLSULFATE 3 MG: 1 INJECTION INTRAVENOUS at 11:29

## 2019-04-15 RX ADMIN — FENTANYL CITRATE 25 MCG: 50 INJECTION, SOLUTION INTRAMUSCULAR; INTRAVENOUS at 11:00

## 2019-04-15 RX ADMIN — FENTANYL CITRATE 25 MCG: 50 INJECTION, SOLUTION INTRAMUSCULAR; INTRAVENOUS at 11:24

## 2019-04-15 RX ADMIN — DEXAMETHASONE SODIUM PHOSPHATE 8 MG: 4 INJECTION, SOLUTION INTRA-ARTICULAR; INTRALESIONAL; INTRAMUSCULAR; INTRAVENOUS; SOFT TISSUE at 09:52

## 2019-04-15 NOTE — INTERVAL H&P NOTE
H&P Update: 
Norah Henry was seen and examined. History and physical has been reviewed. The patient has been examined. There have been no significant clinical changes since the completion of the originally dated History and Physical. 
Patient identified by surgeon; surgical site was confirmed by patient and surgeon.

## 2019-04-15 NOTE — OP NOTES
Operative Report    Patient: Robert Mitchell  MRN: [id number]  SSN: [id number]    YOB: 1980  Age: 43 y.o. Sex: female       Date of Surgery: 4/15/2019     Preoperative Diagnosis:  Hypertrophy of breast [N62]    Postoperative Diagnosis: Hypertrophy of breast [N62]    Surgeon(s) and Role:     * Jojo Eckert MD - Primary    Anesthesia: General    Procedure: Procedure(s):  BILATERAL BREAST REDUCTION    Procedure in Detail:   After general anesthesia was obtained, the chest area was prepped and draped under sterile conditions. A marking pen was used to tamir out inverted T incisions and attention was first directed to the right breast.     A 42 mm disk was placed around the nipple and areola and a marking made here. An incision was made along all the markings and an inferior dermis pedicle was developed up to and around the nipple and areola. A medial and lateral segment of skin and breast tissue was removed and a superior flap of skin and breast tissue elevated. A superior central portion of breast tissue was removed as well. Total amount of tissue removed on the right side was 1802 grams. Having removed the breast tissue and obtained hemostasis, closure was accomplished in an inverted T fashion. Monocryl 3-0 was used to close the vertical incision and 3-0 Quill PD suture used to close the transverse incision. At this point, the 42 mm cookie cutter was placed at the appropriate site for the nipple and areolar and this area deepithelialized. The nipple and areola was brought through, leaving it attached to the inferior keel of breast tissue. Four corner sutures of 5-0 nylon were used to suture the areolar area and then the periareolar closure was accomplished with 3-0 Monoderm Quill suture. Attention was then directed to the left breast where an identical procedure was performed and 1890 grams of tissue removed here.  Satisfied with closure on both sides, 15 mL of 0.5% Marcaine with epinephrine were injected into each breast and then Xeroform gauze and a bulky dressing applied and the operation terminated. Estimated Blood Loss:  200cc      Implants: * No implants in log *            Specimens:   ID Type Source Tests Collected by Time Destination   1 : RIGHT BREAST TISSUE Fresh Breast   4/15/2019 1045 Pathology   2 : LEFT BREAST TISSUE Fresh Breast  Daryl Noonan MD 4/15/2019 1046 Pathology           Drains: None                Complications: None    Counts: Sponge and needle counts were correct times two.     Signed By:  Percy Martin MD     April 15, 2019

## 2019-04-15 NOTE — DISCHARGE SUMMARY
Office appt. One week  Remove wrap tomorrow and put bra on.  Give extra 4x4s to go inside of bra  Keep area dry for two days

## 2019-04-15 NOTE — ANESTHESIA PREPROCEDURE EVALUATION
Relevant Problems No relevant active problems Anesthetic History No history of anesthetic complications Review of Systems / Medical History Patient summary reviewed and pertinent labs reviewed Pulmonary Sleep apnea: No treatment Asthma : well controlled Neuro/Psych Within defined limits Cardiovascular Within defined limits Exercise tolerance: >4 METS 
  
GI/Hepatic/Renal 
  
GERD: well controlled Endo/Other Morbid obesity Other Findings Physical Exam 
 
Airway Mallampati: II 
TM Distance: 4 - 6 cm Neck ROM: normal range of motion Mouth opening: Normal 
 
 Cardiovascular Regular rate and rhythm,  S1 and S2 normal,  no murmur, click, rub, or gallop Dental 
 
 
  
Pulmonary Breath sounds clear to auscultation Abdominal 
 
 
 
 Other Findings Anesthetic Plan ASA: 3 Anesthesia type: general 
 
 
 
 
Induction: Intravenous Anesthetic plan and risks discussed with: Patient and Mother

## 2019-04-15 NOTE — BRIEF OP NOTE
BRIEF OPERATIVE NOTE Date of Procedure: 4/15/2019 Preoperative Diagnosis: Hypertrophy of breast [N62] Postoperative Diagnosis: Hypertrophy of breast [N62] Procedure(s): BILATERAL BREAST REDUCTION Surgeon(s) and Role: Emmanuel Burroughs MD - Primary Surgical Assistant: 0 Surgical Staff: 
Circ-1: Duglas Camargo RN 
Circ-Relief: Stephie Grier RN Scrub Tech-1: Valdo Ferguson Scrub Tech-2: Matthew Steele Scrub Tech-Relief: Moose Parrish CNA Event Time In Time Out Incision Start 4640 Incision Close 1201 Anesthesia: General  
Estimated Blood Loss: 200cc Specimens:  
ID Type Source Tests Collected by Time Destination 1 : RIGHT BREAST TISSUE Fresh Breast   4/15/2019 1045 Pathology 2 : LEFT BREAST TISSUE Fresh Breast  Ale Rose MD 4/15/2019 1046 Pathology Findings: 0 Complications: 00 Implants: * No implants in log *

## 2019-04-15 NOTE — ANESTHESIA POSTPROCEDURE EVALUATION
Procedure(s): BILATERAL BREAST REDUCTION. general 
 
Anesthesia Post Evaluation Multimodal analgesia: multimodal analgesia used between 6 hours prior to anesthesia start to PACU discharge Patient location during evaluation: bedside Patient participation: complete - patient participated Level of consciousness: awake Pain management: adequate Airway patency: patent Anesthetic complications: no 
Cardiovascular status: acceptable and stable Respiratory status: acceptable and room air Hydration status: acceptable Post anesthesia nausea and vomiting:  none Vitals Value Taken Time /76 4/15/2019  2:20 PM  
Temp 36.4 °C (97.5 °F) 4/15/2019 12:25 PM  
Pulse 103 4/15/2019  2:20 PM  
Resp 16 4/15/2019  2:17 PM  
SpO2 97 % 4/15/2019  2:20 PM

## 2019-04-15 NOTE — DISCHARGE INSTRUCTIONS
Office appt. One week  Remove wrap tomorrow and put bra on. Give extra 4x4s to go inside of bra  Keep area dry for two days      DIET  · Clear liquids until no nausea or vomiting; then light diet for the first day. · Advance to regular diet on second day, unless your doctor orders otherwise. · If nausea and vomiting continues, call your doctor. PAIN  · Take pain medication as directed by your doctor. · Call your doctor if pain is NOT relieved by medication. · DO NOT take aspirin of blood thinners unless directed by your doctor. CALL YOUR DOCTOR IF   · Excessive bleeding that does not stop after holding pressure over the area  · Temperature of 101 degrees F or above  · Excessive redness, swelling or bruising, and/ or green or yellow, smelly discharge from incision    AFTER ANESTHESIA   · For the first 24 hours: DO NOT Drive, Drink alcoholic beverages, or Make important decisions. · Be aware of dizziness following anesthesia and while taking pain medication. After general anesthesia or intravenous sedation, for 24 hours or while taking prescription Narcotics:  · Limit your activities  · A responsible adult needs to be with you for the next 24 hours  · Do not drive and operate hazardous machinery  · Do not make important personal or business decisions  · Do  not drink alcoholic beverages  · If you have not urinated within 8 hours after discharge, please contact your surgeon on call. *  Please give a list of your current medications to your Primary Care Provider. *  Please update this list whenever your medications are discontinued, doses are      changed, or new medications (including over-the-counter products) are added. *  Please carry medication information at all times in case of emergency situations.     These are general instructions for a healthy lifestyle:  No smoking/ No tobacco products/ Avoid exposure to second hand smoke  Surgeon General's Warning:  Quitting smoking now greatly reduces serious risk to your health. Obesity, smoking, and sedentary lifestyle greatly increases your risk for illness  A healthy diet, regular physical exercise & weight monitoring are important for maintaining a healthy lifestyle    You may be retaining fluid if you have a history of heart failure or if you experience any of the following symptoms:  Weight gain of 3 pounds or more overnight or 5 pounds in a week, increased swelling in our hands or feet or shortness of breath while lying flat in bed. Please call your doctor as soon as you notice any of these symptoms; do not wait until your next office visit. Recognize signs and symptoms of STROKE:  F-face looks uneven  A-arms unable to move or move unevenly  S-speech slurred or non-existent  T-time-call 911 as soon as signs and symptoms begin-DO NOT go       Back to bed or wait to see if you get better-TIME IS BRAIN.

## 2019-07-15 ENCOUNTER — APPOINTMENT (RX ONLY)
Dept: URBAN - METROPOLITAN AREA CLINIC 349 | Facility: CLINIC | Age: 39
Setting detail: DERMATOLOGY
End: 2019-07-15

## 2019-07-15 DIAGNOSIS — L30.9 DERMATITIS, UNSPECIFIED: ICD-10-CM

## 2019-07-15 DIAGNOSIS — L82.1 OTHER SEBORRHEIC KERATOSIS: ICD-10-CM

## 2019-07-15 PROBLEM — K21.9 GASTRO-ESOPHAGEAL REFLUX DISEASE WITHOUT ESOPHAGITIS: Status: ACTIVE | Noted: 2019-07-15

## 2019-07-15 PROCEDURE — ? COUNSELING

## 2019-07-15 PROCEDURE — 99202 OFFICE O/P NEW SF 15 MIN: CPT

## 2019-07-15 PROCEDURE — ? PRESCRIPTION

## 2019-07-15 PROCEDURE — ? TREATMENT REGIMEN

## 2019-07-15 RX ORDER — ALCLOMETASONE DIPROPIONATE 0.5 MG/G
OINTMENT TOPICAL
Qty: 1 | Refills: 1 | Status: ERX | COMMUNITY
Start: 2019-07-15

## 2019-07-15 RX ORDER — TRIAMCINOLONE ACETONIDE 1 MG/G
CREAM TOPICAL
Qty: 1 | Refills: 1 | Status: ERX | COMMUNITY
Start: 2019-07-15

## 2019-07-15 RX ADMIN — ALCLOMETASONE DIPROPIONATE: 0.5 OINTMENT TOPICAL at 19:04

## 2019-07-15 RX ADMIN — TRIAMCINOLONE ACETONIDE: 1 CREAM TOPICAL at 19:03

## 2019-07-15 ASSESSMENT — LOCATION DETAILED DESCRIPTION DERM
LOCATION DETAILED: RIGHT INFERIOR MEDIAL UPPER BACK
LOCATION DETAILED: LEFT MEDIAL UPPER BACK
LOCATION DETAILED: EPIGASTRIC SKIN
LOCATION DETAILED: MIDDLE STERNUM

## 2019-07-15 ASSESSMENT — LOCATION SIMPLE DESCRIPTION DERM
LOCATION SIMPLE: ABDOMEN
LOCATION SIMPLE: LEFT UPPER BACK
LOCATION SIMPLE: RIGHT UPPER BACK
LOCATION SIMPLE: CHEST

## 2019-07-15 ASSESSMENT — LOCATION ZONE DERM: LOCATION ZONE: TRUNK

## 2019-07-15 NOTE — PROCEDURE: TREATMENT REGIMEN
Initiate Treatment: TMC bid avoid face and folds\\nAlclometasone ointment to face
Detail Level: Zone

## 2019-08-05 ENCOUNTER — APPOINTMENT (OUTPATIENT)
Dept: ULTRASOUND IMAGING | Age: 39
End: 2019-08-05
Attending: EMERGENCY MEDICINE
Payer: COMMERCIAL

## 2019-08-05 ENCOUNTER — HOSPITAL ENCOUNTER (EMERGENCY)
Age: 39
Discharge: HOME | End: 2019-08-05
Attending: EMERGENCY MEDICINE
Payer: COMMERCIAL

## 2019-08-05 VITALS
TEMPERATURE: 98 F | WEIGHT: 293 LBS | DIASTOLIC BLOOD PRESSURE: 88 MMHG | BODY MASS INDEX: 45.99 KG/M2 | HEART RATE: 94 BPM | OXYGEN SATURATION: 97 % | RESPIRATION RATE: 18 BRPM | HEIGHT: 67 IN | SYSTOLIC BLOOD PRESSURE: 146 MMHG

## 2019-08-05 DIAGNOSIS — M71.22 BAKER'S CYST, LEFT: ICD-10-CM

## 2019-08-05 DIAGNOSIS — S83.92XA SPRAIN OF LEFT KNEE, INITIAL ENCOUNTER: Primary | ICD-10-CM

## 2019-08-05 PROCEDURE — 93971 EXTREMITY STUDY: CPT

## 2019-08-05 PROCEDURE — 99282 EMERGENCY DEPT VISIT SF MDM: CPT | Performed by: EMERGENCY MEDICINE

## 2019-08-05 RX ORDER — TRAMADOL HYDROCHLORIDE 50 MG/1
50-100 TABLET ORAL
Qty: 12 TAB | Refills: 0 | Status: SHIPPED | OUTPATIENT
Start: 2019-08-05 | End: 2019-08-08

## 2019-08-05 NOTE — ED NOTES
I have reviewed discharge instructions with the patient. The patient verbalized understanding. Patient left ED via Discharge Method: wheelchair to Home with self. Opportunity for questions and clarification provided. Patient given 0 scripts. To continue your aftercare when you leave the hospital, you may receive an automated call from our care team to check in on how you are doing. This is a free service and part of our promise to provide the best care and service to meet your aftercare needs.  If you have questions, or wish to unsubscribe from this service please call [phone]. Thank you for Choosing our [facility].

## 2019-08-05 NOTE — DISCHARGE INSTRUCTIONS
Rest.  Cool compresses. Use crutches as needed. Rest for the next 2 days. Call orthopedics for appointment for follow-up. Patient Education        Knee Sprain: Care Instructions  Your Care Instructions    A knee sprain is one or more stretched, partly torn, or completely torn knee ligaments. Ligaments are bands of ropelike tissue that connect bone to bone and make the knee stable. The knee has four main ligaments. Knee sprains often happen because of a twisting or bending injury from sports such as skiing, basketball, soccer, or football. The knee turns one way while the lower or upper leg goes another way. A sprain also can happen when the knee is hit from the side or the front. If a knee ligament is slightly stretched, you will probably need only home treatment. You may need a splint or brace (immobilizer) for a partly torn ligament. A complete tear may need surgery. A minor knee sprain may take up to 6 weeks to heal, while a severe sprain may take months. Follow-up care is a key part of your treatment and safety. Be sure to make and go to all appointments, and call your doctor if you are having problems. It's also a good idea to know your test results and keep a list of the medicines you take. How can you care for yourself at home? · Follow instructions about how much weight you can put on your leg and how to walk with crutches. · Prop up your leg on a pillow when you ice it or anytime you sit or lie down for the next 3 days. Try to keep it above the level of your heart. This will help reduce swelling. · Put ice or a cold pack on your knee for 10 to 20 minutes at a time. Try to do this every 1 to 2 hours for the next 3 days (when you are awake) or until the swelling goes down. Put a thin cloth between the ice and your skin. Do not get the splint wet. · If you have an elastic bandage, make sure it is snug but not so tight that your leg is numb, tingles, or swells below the bandage.  You can loosen the bandage if it is too tight. · Your doctor may recommend a brace (immobilizer) to support your knee while it heals. Wear it as directed. · Ask your doctor if you can take an over-the-counter pain medicine, such as acetaminophen (Tylenol), ibuprofen (Advil, Motrin), or naproxen (Aleve). Be safe with medicines. Read and follow all instructions on the label. When should you call for help? Call 911 anytime you think you may need emergency care. For example, call if:    · You have sudden chest pain and shortness of breath, or you cough up blood.    Call your doctor now or seek immediate medical care if:    · You have increased or severe pain.     · You cannot move your toes or ankle.     · Your foot is cool or pale or changes color.     · You have tingling, weakness, or numbness in your foot or leg.     · Your splint or brace feels too tight.     · You are unable to straighten the knee, or the knee \"locks. \"     · You have signs of a blood clot in your leg, such as:  ? Pain in your calf, back of the knee, thigh, or groin. ? Redness and swelling in your leg.    Watch closely for changes in your health, and be sure to contact your doctor if:    · Your pain is not getting better or is getting worse. Where can you learn more? Go to http://anil-cici.info/. Enter N406 in the search box to learn more about \"Knee Sprain: Care Instructions. \"  Current as of: September 20, 2018  Content Version: 12.1  © 3057-0543 [employer]. Care instructions adapted under license by [employer] (which disclaims liability or warranty for this information). If you have questions about a medical condition or this instruction, always ask your healthcare professional. [place] [address] any warranty or liability for your use of this information.          Patient Education        Muniz's Cyst: Care Instructions  Your Care Instructions    A Baker's cyst is a swelling behind the knee. It may cause pain or stiffness when you bend your knee or straighten it all the way. Baker's cysts are also called popliteal cysts. If you have arthritis or another condition that is the cause of the Baker's cyst, your doctor may treat that condition. A Baker's cyst may go away on its own. If not, or if it is causing a lot of discomfort, your doctor may drain the fluid that has built up behind the knee. In some cases, a Baker's cyst is removed in surgery. There are things you can do at home, such as staying off your leg, to reduce the swelling and pain. Follow-up care is a key part of your treatment and safety. Be sure to make and go to all appointments, and call your doctor if you are having problems. It's also a good idea to know your test results and keep a list of the medicines you take. How can you care for yourself at home? · Rest your knee as much as possible. · Ask your doctor if you can take an over-the-counter pain medicine, such as acetaminophen (Tylenol), ibuprofen (Advil, Motrin), or naproxen (Aleve). Be safe with medicines. Read and follow all instructions on the label. · Use a cane, a crutch, a walker, or another device if you need help to get around. These can help rest your knees. · If you have an elastic bandage, make sure it is snug but not so tight that your leg is numb, tingles, or swells below the bandage. Ask your doctor if you can loosen the bandage if it is too tight. · Follow your doctor's instructions about how much weight you can put on your knee. · Stay at a healthy weight. Being overweight puts extra strain on your knee. When should you call for help? Call 911 anytime you think you may need emergency care.  For example, call if:    · You have chest pain, are short of breath, or you cough up blood.    Call your doctor now or seek immediate medical care if:    · You have new or worse pain.     · Your foot is cool or pale or changes color.     · You have tingling, weakness, or numbness in your foot or toes.     · You have signs of a blood clot in your leg (called a deep vein thrombosis), such as:  ? Pain in your calf, back of the knee, thigh, or groin. ? Redness or swelling in your leg.    Watch closely for changes in your health, and be sure to contact your doctor if:    · You do not get better as expected. Where can you learn more? Go to http://anil-cici.info/. Enter Y682 in the search box to learn more about \"Baker's Cyst: Care Instructions. \"  Current as of: September 20, 2018  Content Version: 12.1  © 9306-2905 [employer]. Care instructions adapted under license by [employer] (which disclaims liability or warranty for this information). If you have questions about a medical condition or this instruction, always ask your healthcare professional. [place] [address] any warranty or liability for your use of this information.

## 2019-08-05 NOTE — LETTER
[address] [address] EMERGENCY DEPT 
[address] [facility] [id number] 
[phone] Work/School Note Date: 8/5/2019 To Whom It May concern: 
 
Cari Franco was seen and treated today in the emergency room by the following provider(s): 
Attending Provider: Yessi Khan MD.   
 
Cari Franco may return to work on 8/7. Sincerely, Dara Garcia MD
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
## 2020-01-31 ENCOUNTER — APPOINTMENT (RX ONLY)
Dept: URBAN - METROPOLITAN AREA CLINIC 349 | Facility: CLINIC | Age: 40
Setting detail: DERMATOLOGY
End: 2020-01-31

## 2020-01-31 DIAGNOSIS — L20.89 OTHER ATOPIC DERMATITIS: ICD-10-CM | Status: INADEQUATELY CONTROLLED

## 2020-01-31 DIAGNOSIS — L71.0 PERIORAL DERMATITIS: ICD-10-CM | Status: INADEQUATELY CONTROLLED

## 2020-01-31 PROBLEM — J45.909 UNSPECIFIED ASTHMA, UNCOMPLICATED: Status: ACTIVE | Noted: 2020-01-31

## 2020-01-31 PROBLEM — L20.84 INTRINSIC (ALLERGIC) ECZEMA: Status: ACTIVE | Noted: 2020-01-31

## 2020-01-31 PROCEDURE — ? TREATMENT REGIMEN

## 2020-01-31 PROCEDURE — ? COUNSELING

## 2020-01-31 PROCEDURE — 99213 OFFICE O/P EST LOW 20 MIN: CPT

## 2020-01-31 PROCEDURE — ? PRESCRIPTION

## 2020-01-31 RX ORDER — FLUTICASONE PROPIONATE 0.05 MG/G
OINTMENT TOPICAL
Qty: 1 | Refills: 1 | Status: ERX | COMMUNITY
Start: 2020-01-31

## 2020-01-31 RX ORDER — SULFACETAMIDE SODIUM, SULFUR 98; 48 MG/G; MG/G
CLOTH TOPICAL
Qty: 1 | Refills: 6 | Status: ERX | COMMUNITY
Start: 2020-01-31

## 2020-01-31 RX ADMIN — FLUTICASONE PROPIONATE: 0.05 OINTMENT TOPICAL at 00:00

## 2020-01-31 RX ADMIN — SULFACETAMIDE SODIUM, SULFUR: 98; 48 CLOTH TOPICAL at 00:00

## 2020-01-31 ASSESSMENT — LOCATION ZONE DERM
LOCATION ZONE: FACE
LOCATION ZONE: TRUNK

## 2020-01-31 ASSESSMENT — LOCATION DETAILED DESCRIPTION DERM
LOCATION DETAILED: RIGHT INFERIOR CENTRAL MALAR CHEEK
LOCATION DETAILED: LEFT INFERIOR MEDIAL MALAR CHEEK
LOCATION DETAILED: LEFT SUPERIOR LATERAL MIDBACK

## 2020-01-31 ASSESSMENT — LOCATION SIMPLE DESCRIPTION DERM
LOCATION SIMPLE: LEFT LOWER BACK
LOCATION SIMPLE: RIGHT CHEEK
LOCATION SIMPLE: LEFT CHEEK

## 2020-01-31 NOTE — PROCEDURE: TREATMENT REGIMEN
Initiate Treatment: Fluticasone ointment to flaring patches on body
Discontinue Regimen: Alclometasone ointment to face
Continue Regimen: Triamcinolone cream once daily to body
Detail Level: Zone
Initiate Treatment: Plexion cleanser (sampled) Soolantra once daily (sampled) Minocycline 100 mg once daily for a month

## 2020-03-02 ENCOUNTER — APPOINTMENT (RX ONLY)
Dept: URBAN - METROPOLITAN AREA CLINIC 349 | Facility: CLINIC | Age: 40
Setting detail: DERMATOLOGY
End: 2020-03-02

## 2020-03-02 DIAGNOSIS — L71.0 PERIORAL DERMATITIS: ICD-10-CM | Status: IMPROVED

## 2020-03-02 DIAGNOSIS — L20.89 OTHER ATOPIC DERMATITIS: ICD-10-CM | Status: IMPROVED

## 2020-03-02 PROBLEM — L20.84 INTRINSIC (ALLERGIC) ECZEMA: Status: ACTIVE | Noted: 2020-03-02

## 2020-03-02 PROCEDURE — ? TREATMENT REGIMEN

## 2020-03-02 PROCEDURE — 99213 OFFICE O/P EST LOW 20 MIN: CPT

## 2020-03-02 PROCEDURE — ? PRESCRIPTION

## 2020-03-02 PROCEDURE — ? COUNSELING

## 2020-03-02 PROCEDURE — ? RECOMMENDATIONS

## 2020-03-02 RX ORDER — TRIAMCINOLONE ACETONIDE 1 MG/G
CREAM TOPICAL
Qty: 1 | Refills: 3 | Status: ERX

## 2020-03-02 RX ORDER — CLINDAMYCIN PHOSPHATE 10 MG/ML
LOTION TOPICAL
Qty: 1 | Refills: 1 | Status: ERX | COMMUNITY
Start: 2020-03-02

## 2020-03-02 RX ADMIN — CLINDAMYCIN PHOSPHATE: 10 LOTION TOPICAL at 00:00

## 2020-03-02 ASSESSMENT — LOCATION SIMPLE DESCRIPTION DERM
LOCATION SIMPLE: LEFT CHEEK
LOCATION SIMPLE: LEFT LOWER BACK
LOCATION SIMPLE: RIGHT CHEEK

## 2020-03-02 ASSESSMENT — LOCATION ZONE DERM
LOCATION ZONE: TRUNK
LOCATION ZONE: FACE

## 2020-03-02 ASSESSMENT — LOCATION DETAILED DESCRIPTION DERM
LOCATION DETAILED: RIGHT INFERIOR CENTRAL MALAR CHEEK
LOCATION DETAILED: LEFT SUPERIOR LATERAL MIDBACK
LOCATION DETAILED: LEFT INFERIOR MEDIAL MALAR CHEEK

## 2020-03-02 NOTE — PROCEDURE: TREATMENT REGIMEN
Initiate Treatment: Clindamycin lotion nightly
Detail Level: Zone
Continue Regimen: Plexion cleanser (sampled) Soolantra once daily (sampled) Minocycline 100 mg once daily for a month
Continue Regimen: Triamcinolone cream once daily to body Fluticasone ointment to flaring patches on body
Discontinue Regimen: Alclometasone ointment to face

## 2021-06-26 ENCOUNTER — HOSPITAL ENCOUNTER (OUTPATIENT)
Dept: MRI IMAGING | Age: 41
Discharge: HOME | End: 2021-06-26
Attending: PSYCHIATRY & NEUROLOGY
Payer: COMMERCIAL

## 2021-06-26 DIAGNOSIS — G37.9 DEMYELINATING DISEASE (HCC): ICD-10-CM

## 2021-06-26 PROCEDURE — A9576 INJ PROHANCE MULTIPACK: HCPCS | Performed by: PSYCHIATRY & NEUROLOGY

## 2021-06-26 PROCEDURE — 70553 MRI BRAIN STEM W/O & W/DYE: CPT

## 2021-06-26 PROCEDURE — 74011636320 HC RX REV CODE- 636/320: Performed by: PSYCHIATRY & NEUROLOGY

## 2021-06-26 RX ORDER — SODIUM CHLORIDE 0.9 % (FLUSH) 0.9 %
10 SYRINGE (ML) INJECTION
Status: COMPLETED | OUTPATIENT
Start: 2021-06-26 | End: 2021-06-26

## 2021-06-26 RX ADMIN — GADOTERIDOL 10 ML: 279.3 INJECTION, SOLUTION INTRAVENOUS at 15:32

## 2021-06-26 RX ADMIN — Medication 10 ML: at 15:31

## 2021-06-26 RX ADMIN — GADOTERIDOL 20 ML: 279.3 INJECTION, SOLUTION INTRAVENOUS at 15:31

## 2022-05-16 ENCOUNTER — PREP FOR PROCEDURE (OUTPATIENT)
Dept: SURGERY | Age: 42
End: 2022-05-16

## 2022-05-16 PROBLEM — R22.2 MASS ON BACK: Status: ACTIVE | Noted: 2022-05-16

## 2022-05-23 ENCOUNTER — PREP FOR PROCEDURE (OUTPATIENT)
Dept: SURGERY | Age: 42
End: 2022-05-23

## 2022-05-23 RX ORDER — SODIUM CHLORIDE 0.9 % (FLUSH) 0.9 %
5-40 SYRINGE (ML) INJECTION PRN
Status: CANCELLED | OUTPATIENT
Start: 2022-05-23

## 2022-05-23 RX ORDER — SODIUM CHLORIDE 0.9 % (FLUSH) 0.9 %
5-40 SYRINGE (ML) INJECTION EVERY 12 HOURS SCHEDULED
Status: CANCELLED | OUTPATIENT
Start: 2022-05-23

## 2022-05-23 RX ORDER — SODIUM CHLORIDE 9 MG/ML
INJECTION, SOLUTION INTRAVENOUS PRN
Status: CANCELLED | OUTPATIENT
Start: 2022-05-23

## 2022-06-03 NOTE — PROGRESS NOTES
Patient verified name and . Order for consent not found in EHR; patient verifies procedure. Type 1B surgery, Phone assessment complete. Orders not received. Labs per surgeon: none  Labs per anesthesia protocol: none    Patient answered medical/surgical history questions at their best of ability. All prior to admission medications documented in [facility]. Patient instructed to take the following medications the day of surgery according to anesthesia guidelines with a small sip of water: Prilosec and PRN inhalers. Hold all vitamins 7 days prior to surgery and NSAIDS 5 days prior to surgery. Prescription meds to hold: Toradol, Mobic, Multivitamin, and Magnalight. Patient instructed on the following:    > Arrive at [facility], time of arrival to be called the day before by 1700  > NPO after midnight including gum, mints, and ice chips    > Responsible adult must drive patient to the hospital, stay during surgery, and patient will need supervision 24 hours after anesthesia    > Use antibacterial Soap in shower the night before surgery and on the morning of surgery, Pt states she is allergic to antibacterial Soap. Pt instructed to shower with soap appropriate for pt skin and to make sure pt doesn't apply any lotions, powders, or oil the DOS. > All piercings must be removed prior to arrival.    > Leave all valuables (money and jewelry) at home but bring insurance card and ID on DOS.     > You may be required to pay a deductible or co-pay on the day of your procedure. You can pre-pay by calling [phone] if your surgery is at the [facility] or [phone] if your surgery is at the [facility]. > Do not wear make-up, nail polish, lotions, cologne, perfumes, powders, or oil on skin. Artificial nails are not permitted.

## 2022-06-07 ENCOUNTER — ANESTHESIA EVENT (OUTPATIENT)
Dept: SURGERY | Age: 42
End: 2022-06-07
Payer: COMMERCIAL

## 2022-06-07 ENCOUNTER — ANESTHESIA (OUTPATIENT)
Dept: SURGERY | Age: 42
End: 2022-06-07
Payer: COMMERCIAL

## 2022-06-07 ENCOUNTER — HOSPITAL ENCOUNTER (OUTPATIENT)
Age: 42
Setting detail: OUTPATIENT SURGERY
Discharge: HOME | End: 2022-06-07
Attending: SURGERY | Admitting: SURGERY
Payer: COMMERCIAL

## 2022-06-07 VITALS
WEIGHT: 293 LBS | DIASTOLIC BLOOD PRESSURE: 62 MMHG | TEMPERATURE: 98 F | HEART RATE: 64 BPM | OXYGEN SATURATION: 93 % | HEIGHT: 67 IN | BODY MASS INDEX: 45.99 KG/M2 | RESPIRATION RATE: 15 BRPM | SYSTOLIC BLOOD PRESSURE: 120 MMHG

## 2022-06-07 DIAGNOSIS — R22.2 MASS ON BACK: Primary | ICD-10-CM

## 2022-06-07 PROCEDURE — 3600000012 HC SURGERY LEVEL 2 ADDTL 15MIN: Performed by: SURGERY

## 2022-06-07 PROCEDURE — 7100000000 HC PACU RECOVERY - FIRST 15 MIN: Performed by: SURGERY

## 2022-06-07 PROCEDURE — 6360000002 HC RX W HCPCS: Performed by: NURSE ANESTHETIST, CERTIFIED REGISTERED

## 2022-06-07 PROCEDURE — 88304 TISSUE EXAM BY PATHOLOGIST: CPT

## 2022-06-07 PROCEDURE — 88305 TISSUE EXAM BY PATHOLOGIST: CPT

## 2022-06-07 PROCEDURE — 2709999900 HC NON-CHARGEABLE SUPPLY: Performed by: SURGERY

## 2022-06-07 PROCEDURE — 2500000003 HC RX 250 WO HCPCS: Performed by: SURGERY

## 2022-06-07 PROCEDURE — 2580000003 HC RX 258: Performed by: ANESTHESIOLOGY

## 2022-06-07 PROCEDURE — 6370000000 HC RX 637 (ALT 250 FOR IP): Performed by: ANESTHESIOLOGY

## 2022-06-07 PROCEDURE — 3700000001 HC ADD 15 MINUTES (ANESTHESIA): Performed by: SURGERY

## 2022-06-07 PROCEDURE — 3700000000 HC ANESTHESIA ATTENDED CARE: Performed by: SURGERY

## 2022-06-07 PROCEDURE — 2580000003 HC RX 258: Performed by: NURSE ANESTHETIST, CERTIFIED REGISTERED

## 2022-06-07 PROCEDURE — 2500000003 HC RX 250 WO HCPCS: Performed by: NURSE ANESTHETIST, CERTIFIED REGISTERED

## 2022-06-07 PROCEDURE — 7100000010 HC PHASE II RECOVERY - FIRST 15 MIN: Performed by: SURGERY

## 2022-06-07 PROCEDURE — 21931 EXC BACK LES SC 3 CM/>: CPT | Performed by: SURGERY

## 2022-06-07 PROCEDURE — 3600000002 HC SURGERY LEVEL 2 BASE: Performed by: SURGERY

## 2022-06-07 PROCEDURE — 21930 EXC BACK LES SC < 3 CM: CPT | Performed by: SURGERY

## 2022-06-07 PROCEDURE — 6360000002 HC RX W HCPCS: Performed by: ANESTHESIOLOGY

## 2022-06-07 PROCEDURE — 7100000001 HC PACU RECOVERY - ADDTL 15 MIN: Performed by: SURGERY

## 2022-06-07 RX ORDER — FENTANYL CITRATE 50 UG/ML
INJECTION, SOLUTION INTRAMUSCULAR; INTRAVENOUS PRN
Status: DISCONTINUED | OUTPATIENT
Start: 2022-06-07 | End: 2022-06-07 | Stop reason: SDUPTHER

## 2022-06-07 RX ORDER — BUPIVACAINE HYDROCHLORIDE 2.5 MG/ML
INJECTION, SOLUTION EPIDURAL; INFILTRATION; INTRACAUDAL PRN
Status: DISCONTINUED | OUTPATIENT
Start: 2022-06-07 | End: 2022-06-07 | Stop reason: ALTCHOICE

## 2022-06-07 RX ORDER — DEXAMETHASONE SODIUM PHOSPHATE 4 MG/ML
INJECTION, SOLUTION INTRA-ARTICULAR; INTRALESIONAL; INTRAMUSCULAR; INTRAVENOUS; SOFT TISSUE PRN
Status: DISCONTINUED | OUTPATIENT
Start: 2022-06-07 | End: 2022-06-07 | Stop reason: SDUPTHER

## 2022-06-07 RX ORDER — ONDANSETRON 2 MG/ML
4 INJECTION INTRAMUSCULAR; INTRAVENOUS
Status: DISCONTINUED | OUTPATIENT
Start: 2022-06-07 | End: 2022-06-07 | Stop reason: HOSPADM

## 2022-06-07 RX ORDER — NEOSTIGMINE METHYLSULFATE 1 MG/ML
INJECTION, SOLUTION INTRAVENOUS PRN
Status: DISCONTINUED | OUTPATIENT
Start: 2022-06-07 | End: 2022-06-07 | Stop reason: SDUPTHER

## 2022-06-07 RX ORDER — OXYCODONE HYDROCHLORIDE 5 MG/1
10 TABLET ORAL PRN
Status: COMPLETED | OUTPATIENT
Start: 2022-06-07 | End: 2022-06-07

## 2022-06-07 RX ORDER — LIDOCAINE HYDROCHLORIDE 20 MG/ML
INJECTION, SOLUTION EPIDURAL; INFILTRATION; INTRACAUDAL; PERINEURAL PRN
Status: DISCONTINUED | OUTPATIENT
Start: 2022-06-07 | End: 2022-06-07 | Stop reason: SDUPTHER

## 2022-06-07 RX ORDER — HYDROCODONE BITARTRATE AND ACETAMINOPHEN 5; 325 MG/1; MG/1
1-2 TABLET ORAL EVERY 8 HOURS PRN
Qty: 28 TABLET | Refills: 0 | Status: SHIPPED | OUTPATIENT
Start: 2022-06-07 | End: 2022-06-14

## 2022-06-07 RX ORDER — GLYCOPYRROLATE 0.2 MG/ML
INJECTION INTRAMUSCULAR; INTRAVENOUS PRN
Status: DISCONTINUED | OUTPATIENT
Start: 2022-06-07 | End: 2022-06-07 | Stop reason: SDUPTHER

## 2022-06-07 RX ORDER — ROCURONIUM BROMIDE 10 MG/ML
INJECTION, SOLUTION INTRAVENOUS PRN
Status: DISCONTINUED | OUTPATIENT
Start: 2022-06-07 | End: 2022-06-07 | Stop reason: SDUPTHER

## 2022-06-07 RX ORDER — HYDROMORPHONE HYDROCHLORIDE 2 MG/ML
0.5 INJECTION, SOLUTION INTRAMUSCULAR; INTRAVENOUS; SUBCUTANEOUS EVERY 5 MIN PRN
Status: DISCONTINUED | OUTPATIENT
Start: 2022-06-07 | End: 2022-06-07 | Stop reason: HOSPADM

## 2022-06-07 RX ORDER — SODIUM CHLORIDE 0.9 % (FLUSH) 0.9 %
5-40 SYRINGE (ML) INJECTION PRN
Status: DISCONTINUED | OUTPATIENT
Start: 2022-06-07 | End: 2022-06-07 | Stop reason: HOSPADM

## 2022-06-07 RX ORDER — ACETAMINOPHEN 500 MG
1000 TABLET ORAL ONCE
Status: COMPLETED | OUTPATIENT
Start: 2022-06-07 | End: 2022-06-07

## 2022-06-07 RX ORDER — OXYCODONE HYDROCHLORIDE 5 MG/1
5 TABLET ORAL PRN
Status: COMPLETED | OUTPATIENT
Start: 2022-06-07 | End: 2022-06-07

## 2022-06-07 RX ORDER — SODIUM CHLORIDE, SODIUM LACTATE, POTASSIUM CHLORIDE, CALCIUM CHLORIDE 600; 310; 30; 20 MG/100ML; MG/100ML; MG/100ML; MG/100ML
INJECTION, SOLUTION INTRAVENOUS CONTINUOUS PRN
Status: DISCONTINUED | OUTPATIENT
Start: 2022-06-07 | End: 2022-06-07 | Stop reason: SDUPTHER

## 2022-06-07 RX ORDER — FENTANYL CITRATE 50 UG/ML
100 INJECTION, SOLUTION INTRAMUSCULAR; INTRAVENOUS
Status: DISCONTINUED | OUTPATIENT
Start: 2022-06-07 | End: 2022-06-07 | Stop reason: HOSPADM

## 2022-06-07 RX ORDER — DIPHENHYDRAMINE HYDROCHLORIDE 50 MG/ML
12.5 INJECTION INTRAMUSCULAR; INTRAVENOUS
Status: DISCONTINUED | OUTPATIENT
Start: 2022-06-07 | End: 2022-06-07 | Stop reason: HOSPADM

## 2022-06-07 RX ORDER — HYDROMORPHONE HYDROCHLORIDE 2 MG/ML
0.25 INJECTION, SOLUTION INTRAMUSCULAR; INTRAVENOUS; SUBCUTANEOUS EVERY 5 MIN PRN
Status: COMPLETED | OUTPATIENT
Start: 2022-06-07 | End: 2022-06-07

## 2022-06-07 RX ORDER — ONDANSETRON 2 MG/ML
INJECTION INTRAMUSCULAR; INTRAVENOUS PRN
Status: DISCONTINUED | OUTPATIENT
Start: 2022-06-07 | End: 2022-06-07 | Stop reason: SDUPTHER

## 2022-06-07 RX ORDER — PROPOFOL 10 MG/ML
INJECTION, EMULSION INTRAVENOUS PRN
Status: DISCONTINUED | OUTPATIENT
Start: 2022-06-07 | End: 2022-06-07 | Stop reason: SDUPTHER

## 2022-06-07 RX ORDER — MIDAZOLAM HYDROCHLORIDE 2 MG/2ML
2 INJECTION, SOLUTION INTRAMUSCULAR; INTRAVENOUS
Status: DISCONTINUED | OUTPATIENT
Start: 2022-06-07 | End: 2022-06-07 | Stop reason: HOSPADM

## 2022-06-07 RX ORDER — SODIUM CHLORIDE 0.9 % (FLUSH) 0.9 %
5-40 SYRINGE (ML) INJECTION EVERY 12 HOURS SCHEDULED
Status: DISCONTINUED | OUTPATIENT
Start: 2022-06-07 | End: 2022-06-07 | Stop reason: HOSPADM

## 2022-06-07 RX ORDER — SODIUM CHLORIDE, SODIUM LACTATE, POTASSIUM CHLORIDE, CALCIUM CHLORIDE 600; 310; 30; 20 MG/100ML; MG/100ML; MG/100ML; MG/100ML
INJECTION, SOLUTION INTRAVENOUS CONTINUOUS
Status: DISCONTINUED | OUTPATIENT
Start: 2022-06-07 | End: 2022-06-07 | Stop reason: HOSPADM

## 2022-06-07 RX ADMIN — Medication 100 MCG: at 11:47

## 2022-06-07 RX ADMIN — ROCURONIUM BROMIDE 50 MG: 50 INJECTION, SOLUTION INTRAVENOUS at 11:09

## 2022-06-07 RX ADMIN — Medication 5 MG: at 11:54

## 2022-06-07 RX ADMIN — HYDROMORPHONE HYDROCHLORIDE 0.25 MG: 2 INJECTION INTRAMUSCULAR; INTRAVENOUS; SUBCUTANEOUS at 12:25

## 2022-06-07 RX ADMIN — Medication 100 MCG: at 11:30

## 2022-06-07 RX ADMIN — Medication 3000 MG: at 11:27

## 2022-06-07 RX ADMIN — HYDROMORPHONE HYDROCHLORIDE 0.5 MG: 2 INJECTION INTRAMUSCULAR; INTRAVENOUS; SUBCUTANEOUS at 12:35

## 2022-06-07 RX ADMIN — SODIUM CHLORIDE, POTASSIUM CHLORIDE, SODIUM LACTATE AND CALCIUM CHLORIDE: 600; 310; 30; 20 INJECTION, SOLUTION INTRAVENOUS at 08:31

## 2022-06-07 RX ADMIN — ACETAMINOPHEN 1000 MG: 500 TABLET ORAL at 08:31

## 2022-06-07 RX ADMIN — PROPOFOL 200 MG: 10 INJECTION, EMULSION INTRAVENOUS at 11:09

## 2022-06-07 RX ADMIN — HYDROMORPHONE HYDROCHLORIDE 0.5 MG: 2 INJECTION INTRAMUSCULAR; INTRAVENOUS; SUBCUTANEOUS at 12:40

## 2022-06-07 RX ADMIN — Medication 100 MCG: at 11:40

## 2022-06-07 RX ADMIN — DEXAMETHASONE SODIUM PHOSPHATE 4 MG: 4 INJECTION, SOLUTION INTRAMUSCULAR; INTRAVENOUS at 11:05

## 2022-06-07 RX ADMIN — ONDANSETRON 4 MG: 2 INJECTION INTRAMUSCULAR; INTRAVENOUS at 11:05

## 2022-06-07 RX ADMIN — HYDROMORPHONE HYDROCHLORIDE 0.25 MG: 2 INJECTION INTRAMUSCULAR; INTRAVENOUS; SUBCUTANEOUS at 12:30

## 2022-06-07 RX ADMIN — GLYCOPYRROLATE 0.8 MG: 0.2 INJECTION, SOLUTION INTRAMUSCULAR; INTRAVENOUS at 11:54

## 2022-06-07 RX ADMIN — OXYCODONE 10 MG: 5 TABLET ORAL at 12:44

## 2022-06-07 RX ADMIN — FENTANYL CITRATE 50 MCG: 50 INJECTION INTRAMUSCULAR; INTRAVENOUS at 11:09

## 2022-06-07 RX ADMIN — FENTANYL CITRATE 50 MCG: 50 INJECTION INTRAMUSCULAR; INTRAVENOUS at 11:31

## 2022-06-07 RX ADMIN — LIDOCAINE HYDROCHLORIDE 100 MG: 20 INJECTION, SOLUTION EPIDURAL; INFILTRATION; INTRACAUDAL; PERINEURAL at 11:09

## 2022-06-07 RX ADMIN — SODIUM CHLORIDE, SODIUM LACTATE, POTASSIUM CHLORIDE, AND CALCIUM CHLORIDE: 600; 310; 30; 20 INJECTION, SOLUTION INTRAVENOUS at 11:05

## 2022-06-07 RX ADMIN — PROPOFOL 30 MG: 10 INJECTION, EMULSION INTRAVENOUS at 12:00

## 2022-06-07 ASSESSMENT — PAIN SCALES - GENERAL
PAINLEVEL_OUTOF10: 6
PAINLEVEL_OUTOF10: 9
PAINLEVEL_OUTOF10: 10
PAINLEVEL_OUTOF10: 10
PAINLEVEL_OUTOF10: 7
PAINLEVEL_OUTOF10: 9

## 2022-06-07 ASSESSMENT — PAIN - FUNCTIONAL ASSESSMENT
PAIN_FUNCTIONAL_ASSESSMENT: 0-10
PAIN_FUNCTIONAL_ASSESSMENT: NONE - DENIES PAIN
PAIN_FUNCTIONAL_ASSESSMENT: NONE - DENIES PAIN

## 2022-06-07 NOTE — H&P
H&P/Consult Note/Progress Note/Office Note:   Ted Hernandez  MRN: [id number]  :1980  Age:41 y.o.    HPI: Ted Hernandez is a 42 y.o. female who is here for back mass excisions x 5 on 22. Prior to surgery she was referred in consultation by Dr. Sultana Vogt for evaluation of soft tissue masses of the back.     She reported soft tissue masses of the back which were present for about a year and slowly getting bigger in size. Nothing particular made them better or worse. A few of these were tender to palpation. No associated fevers. BMI>46  She wanted them excised. Past Medical History:   Diagnosis Date    Arthritis     osteo    Asthma     last attack 10/2018    Chronic pain     GERD (gastroesophageal reflux disease)     controlled with medication    GERD (gastroesophageal reflux disease)     Insomnia     Low back pain     Migraines     Morbid obesity (HCC)     BMI 46    Otitis media     PONV (postoperative nausea and vomiting)     small amount of Nausea post-op    Seasonal allergies     Sleep apnea     complaint with CPAP     Past Surgical History:   Procedure Laterality Date    BREAST REDUCTION SURGERY Bilateral 4/15/2019    BILATERAL BREAST REDUCTION performed by Chuy Trevizo MD at [address] [place] [address]  2015    ANH AND BSO  2016    WISDOM TOOTH EXTRACTION       No current facility-administered medications for this encounter.      Current Outpatient Medications   Medication Sig    Multiple Vitamins-Minerals (MULTIVITAMIN ADULTS PO) Take by mouth daily    Meloxicam (MOBIC PO) Take by mouth daily    NONFORMULARY daily Magnalight supplement for Joint Pain    albuterol sulfate  (90 Base) MCG/ACT inhaler Inhale into the lungs as needed     albuterol (PROVENTIL) (5 MG/ML) 0.5% nebulizer solution Inhale 2.5 mg into the lungs as needed     Cetirizine HCl 10 MG CAPS Take by mouth nightly     eletriptan (RELPAX) 40 MG tablet [place] one at onset of headache may repeat in 2 hours if necessary    eszopiclone (LUNESTA) 1 MG TABS Take 1 mg by mouth nightly.  fluticasone (FLOVENT HFA) 220 MCG/ACT inhaler Inhale into the lungs as needed     ketorolac (TORADOL) 10 MG tablet Take by mouth as needed    montelukast (SINGULAIR) 10 MG tablet Take 10 mg by mouth nightly     omeprazole (PRILOSEC OTC) 20 MG tablet Take 20 mg by mouth as needed     traZODone (DESYREL) 50 MG tablet Take by mouth nightly      Latex, Shellfish-derived products, Chocolate, and Mushroom extract complex  Social History     Socioeconomic History    Marital status: Single     Spouse name: None    Number of children: None    Years of education: None    Highest education level: None   Occupational History    None   Tobacco Use    Smoking status: Never Smoker    Smokeless tobacco: Never Used   Vaping Use    Vaping Use: Never used   Substance and Sexual Activity    Alcohol use: Yes     Alcohol/week: 1.0 standard drink     Comment: occassionally    Drug use: Never    Sexual activity: None   Other Topics Concern    None   Social History Narrative    None     Social Determinants of Health     Financial Resource Strain:     Difficulty of Paying Living Expenses: Not on file   Food Insecurity:     Worried About Running Out of Food in the Last Year: Not on file    Robyn of Food in the Last Year: Not on file   Transportation Needs:     Lack of Transportation (Medical): Not on file    Lack of Transportation (Non-Medical):  Not on file   Physical Activity:     Days of Exercise per Week: Not on file    Minutes of Exercise per Session: Not on file   Stress:     Feeling of Stress : Not on file   Social Connections:     Frequency of Communication with Friends and Family: Not on file    Frequency of Social Gatherings with Friends and Family: Not on file    Attends Temple Services: Not on file    Active Member of Clubs or Organizations: Not on file    Attends Club or Organization Meetings: Not on file    Marital Status: Not on file   Intimate Partner Violence:     Fear of Current or Ex-Partner: Not on file    Emotionally Abused: Not on file    Physically Abused: Not on file    Sexually Abused: Not on file   Housing Stability:     Unable to Pay for Housing in the Last Year: Not on file    Number of [place] in the Last Year: Not on file    Unstable Housing in the Last Year: Not on file     Social History     Tobacco Use   Smoking Status Never Smoker   Smokeless Tobacco Never Used     Family History   Problem Relation Age of Onset    Asthma Mother     Heart Attack Father     Bleeding Prob Father     Diabetes Father     Hypertension Father     Cancer Sister         brain ca    No Known Problems Brother     No Known Problems Sister     No Known Problems Brother      ROS: The patient has no difficulty with chest pain or shortness of breath. No fever or chills. Comprehensive review of systems was otherwise unremarkable except as noted above. Physical Exam:   Ht 5' 7\" (1.702 m)   Wt 295 lb (133.8 kg)   BMI 46.20 kg/m²   Vitals:    06/03/22 1436   Weight: 295 lb (133.8 kg)   Height: 5' 7\" (1.702 m)     No intake/output data recorded. No intake/output data recorded. Constitutional: Alert, oriented, cooperative patient in no acute distress; appears stated age    Eyes:Sclera are clear. EOMs intact  ENMT: no external lesions gross hearing normal; no obvious neck masses, no ear or lip lesions, nares normal  CV: RRR. Normal perfusion  Resp: No JVD. Breathing is  non-labored; no audible wheezing.       She has 5 soft tissue masses of the back. 3 of these involve the upper back in the scapular region on the right. 1 involves the left upper back over medial left scapula. The last lesion is to left of midline in mid back  No overlying skin changes.     GI: soft and non-distended     Musculoskeletal: unremarkable with normal function. No embolic signs or cyanosis. Neuro: Oriented; moves all 4; no focal deficits  Psychiatric: normal affect and mood, no memory impairment          Recent vitals (if inpt):  No data found. Amount and/or Complexity of Data Reviewed and Analyzed:  I reviewed and analyzed all of the unique labs and radiologic studies that are shown below as well as any that are in the HPI, and any that are in the expanded problem list below  *Each unique test, order, or document contributes to the combination of 2 or combination of 3 in Category 1 below. For this visit I also reviewed old records and prior notes. No results for input(s): WBC, HGB, PLT, NA, K, CL, CO2, BUN, CREA, GLU, INR, APTT, ALT, AML, AML, LCAD, PCO2, PO2, HCO3 in the last 72 hours. Invalid input(s): PTP, TBIL, TBILI, CBIL, SGOT, GPT, AP, LPSE, NH4, TROPT, TROIQ,  PH  Review of most recent CBC  No results found for: WBC, HGB, HCT, MCV, PLT    Review of most recent BMP  No results found for: NA, K, CL, CO2, BUN, CREATININE, GLUCOSE, CALCIUM     Review of most recent LFTs (and lipase if done)  No results found for: ALT, AST, GGT, ALKPHOS, BILITOT  No results found for: LIPASE    No results found for: INR, APTT, LCAD    Review of most recent HgbA1c  No results found for: LABA1C  No results found for: EAG    Nutritional assessment screen for wound healing issues:  No results found for: LABPROT, LABALBU    @lastcovr@    Xray Result (most recent):  XR CHEST PORTABLE 01/27/2019    Narrative  CHEST X-RAY, single portable view  1/27/2019    History: Fever. Technique: Single frontal view of the chest.    Comparison: None. Findings: The cardiac silhouette is normal in respect to size. The lungs are expanded  without evidence for pneumothorax. No consolidative airspace process or  pleural effusion is seen. Impression  IMPRESSION:  1.   No acute cardiopulmonary process evident on single frontal view of the  chest.    CT Result (most recent):  CT CHEST W CONTRAST 01/27/2019    Narrative  CTA OF THE CHEST - PE STUDY    HISTORY: Shortness of breath and tachycardia. COMPARISON: None. TECHNIQUE: A helical acquisition was performed through the chest utilizing  1.58YW slice thickness during the infusion of 100 cc of Isovue-370. 3-D  post-processed images were created on an independent workstation. Multiplanar  reformats were obtained. The exam was focused on the pulmonary arteries. Dose reduction techniques used: Automated exposure control, adjustment of the  mAs and/or kVp according to patient's size, and iterative reconstruction  techniques. FINDINGS:  *  PULMONARY VESSELS: No evidence of pulmonary embolism. *  PLEURA / PERICARDIUM: Within normal limits. *  JOHANNA / MEDIASTINUM: Within normal limits. *  LUNGS: Within normal limits. *  TRACHEOBRONCHIAL TREE: Within normal limits. *  AORTA: Within normal limits. *  CORONARY ARTERIES: No coronary artery calcification is seen. *  CHEST WALL/AXILLA: Within normal limits. *  VISUALIZED UPPER ABDOMEN: Cholecystectomy. *  SPINE / BONES: Within normal limits. *  ADDITIONAL COMMENTS: None. Impression  IMPRESSION:    No evidence of pulmonary embolism. Cholecystectomy. Date of Dictation: 1/27/2019 10:53 PM    US Result (most recent):  US DUP LOWER EXTREMITY LEFT SHERWIN 08/05/2019    Narrative  Left lower extremity venous ultrasound    INDICATION:  Pain and swelling,    Doppler ultrasound of the left lower extremity was performed. FINDINGS:  There is normal flow in the greater saphenous, common femoral,  superficial femoral, and popliteal veins. Normal compression and augmentation is  demonstrated. The proximal calf veins are also patent. There is a 5 cm left  popliteal cyst.    Impression  IMPRESSION:  1. No evidence of deep venous thrombosis in the left lower extremity.   2.  Left popliteal cyst      Admission date (for inpatients): (Not on file)   * No surgery date entered *  Procedure(s):  MASS EXCISION OF BACK  x 5/PRONE        ASSESSMENT/PLAN:  [unfilled]  Active Problems:    * No active hospital problems. *  Resolved Problems:    * No resolved hospital problems. *     Patient Active Problem List    Diagnosis Date Noted    Mass on back 05/16/2022          Number and Complexity of Problems addressed and   Risks of complications and/or morbidity of management        Multiple soft tissue masses of the back  She is here for back mass excisions x 5 on 6/7/22. Informed consent was obtained for excision of all 5 soft tissue masses of the back     We will send the tissue to pathology for review. Procedure risks were discussed including the risk of recurrence  All questions were answered. She is in favor proceeding. Level of MDM (2/3 elements below)  Number and Complexity of Problems Addressed Amount and/or Complexity of Data to be Reviewed and Analyzed  *Each unique test, order, or document contributes to the combination of 2 or combination of 3 in Category 1 below.  Risk of Complications and/or Morbidity or Mortality of pt Management     08807  65247 SF Minimal  1self-limited or minor problem Minimal or none Minimal risk of morbidity from additional diagnostic testing or Rx   21200  62836 Low Low  2or more self-limited or minor problems;    or  1stable chronic illness;    or  2JPFWT, uncomplicated illness or injury   Limited  (Must meet the requirements of at least 1 of the 2 categories)  Category 1: Tests and documents   Any combination of 2 from the following:  Review of prior external note(s) from each unique source*;  review of the result(s) of each unique test*;   ordering of each unique test*    or   Category 2: Assessment requiring an independent historian(s)  (For the categories of independent interpretation of tests and discussion of management or test interpretation, see moderate or high) Low risk of morbidity from additional diagnostic testing or treatment     10322  86745 Mod Moderate  1or more chronic illnesses with exacerbation, progression, or side effects of treatment;    or  2or more stable chronic illnesses;    or  1undiagnosed new problem with uncertain prognosis;    or  1acute illness with systemic symptoms;    or  3KYWJM complicated injury   Moderate  (Must meet the requirements of at least 1 out of 3 categories)  Category 1: Tests, documents, or independent historian(s)  Any combination of 3 from the following:   Review of prior external note(s) from each unique source*;  Review of the result(s) of each unique test*;  Ordering of each unique test*;  Assessment requiring an independent historian(s)    or  Category 2: Independent interpretation of tests   Independent interpretation of a test performed by another physician/other qualified health care professional (not separately reported);     or  Category 3: Discussion of management or test interpretation  Discussion of management or test interpretation with external physician/other qualified health care professional/appropriate source (not separately reported)   Moderate risk of morbidity from additional diagnostic testing or treatment  Examples only:  Prescription drug management   Decision regarding minor surgery with identified patient or procedure risk factors  Decision regarding elective major surgery without identified patient or procedure risk factors   Diagnosis or treatment significantly limited by social determinants of health       [id number] High High  1or more chronic illnesses with severe exacerbation, progression, or side effects of treatment;    or  1 acute or chronic illness or injury that poses a threat to life or bodily function   Extensive  (Must meet the requirements of at least 2 out of 3 categories)  Category 1: Tests, documents, or independent historian(s)  Any combination of 3 from the following:   Review of prior external note(s) from each unique source*;  Review of the result(s) of each unique test*;   Ordering of each unique test*;   Assessment requiring an independent historian(s)    or   Category 2: Independent interpretation of tests   Independent interpretation of a test performed by another physician/other qualified health care professional (not separately reported);     or  Category 3: Discussion of management or test interpretation  Discussion of management or test interpretation with external physician/other qualified health care professional/appropriate source (not separately reported)   High risk of morbidity from additional diagnostic testing or treatment  Examples only:  Drug therapy requiring intensive monitoring for toxicity  Decision regarding elective major surgery with identified patient or procedure risk factors  Decision regarding emergency major surgery  Decision regarding hospitalization  Decision not to resuscitate or to de-escalate care because of poor prognosis             I have personally performed a face-to-face diagnostic evaluation and management  service on this patient. I have independently seen the patient. I have independently obtained the above history from the patient/family. I have independently examined the patient with above findings. I have independently reviewed data/labs for this patient and developed the above plan of care (MDM).       Signed: Tim Iqbal MD  6/7/2022    6:25 AM

## 2022-06-07 NOTE — ANESTHESIA POSTPROCEDURE EVALUATION
Department of Anesthesiology  Postprocedure Note    Patient: Sadie Angeles  MRN: [id number]  YOB: 1980  Date of evaluation: 6/7/2022  Time:  1:25 PM     Procedure Summary     Date: 06/07/22 Room / Location: [facility] MAIN OR 08 / [facility] MAIN OR    Anesthesia Start: 1104 Anesthesia Stop: 1218    Procedure: MASS EXCISION OF BACK  x 5/PRONE (N/A Back) Diagnosis:       Mass of skin of back      (Mass of skin of back [R22.2])    Providers: Tan Masterson MD Responsible Provider: Monet Espinoza MD    Anesthesia Type: general ASA Status: 3          Anesthesia Type: No value filed. James Phase I: James Score: 9    James Phase II: James Score: 10    Last vitals: Reviewed and per EMR flowsheets.        Anesthesia Post Evaluation    Patient location during evaluation: PACU  Patient participation: complete - patient participated  Level of consciousness: awake and alert  Airway patency: patent  Nausea & Vomiting: no nausea  Cardiovascular status: hemodynamically stable  Respiratory status: acceptable  Hydration status: euvolemic

## 2022-06-07 NOTE — OP NOTE
[facility]  OPERATIVE REPORT    Name:  Pamella Platt  MR#:  [id number]  :  1980  ACCOUNT #:  [de-identified]  DATE OF SERVICE:  2022    PREOPERATIVE DIAGNOSIS:  Multiple soft tissue masses of the back. POSTOPERATIVE DIAGNOSIS:  Multiple soft tissue masses of the back. PROCEDURES PERFORMED:  1. Excision of 3-cm right upper back soft tissue mass from subcutaneous tissues (CPT 08709). 2.  Excision of right upper back lateral subcutaneous soft tissue mass, 1.6 cm (CPT 60501-11). 3.  Excision of right upper back inferior 2-cm subcutaneous soft tissue mass (CPT 01212-28-22). 4.  Excision of left mid back soft tissue mass, 2.0 cm (CPT 76162-51-12). 5.  Excision of left upper back soft tissue mass, 2.5 cm (CPT 54073-09-77). SURGEON:  Adamaris Worthy MD    ASSISTANT:  None    ANESTHESIA:  General.    COMPLICATIONS:  None. SPECIMENS REMOVED:  All five masses sent to Pathology for review. IMPLANTS:  None. ESTIMATED BLOOD LOSS:  Less than 10 mL. PROCEDURE: The patient was taken to the operating room and placed in a supine position. After adequate anesthesia was given, she was repositioned prone. Her back was prepped and draped in a sterile fashion with multiple layers of Betadine scrub and Betadine solution. Time-out protocol was followed. She was given prophylactic antibiotics. A #15 scalpel was used to make incisions over each of the five soft tissue masses. Five total incisions were made. The subcutaneous soft tissues were dissected to excise completely subcutaneous soft tissue masses from each location.   A right upper back 3-cm superior soft tissue mass was excised and sent to Pathology, a right upper back lateral 1.6-cm soft tissue mass was excised and sent to Pathology, a right upper back inferior 2.0-cm mass was excised and sent to Pathology, a left mid back soft tissue mass which was 2 cm in size was sent to Pathology for review after excision, a left upper back soft tissue mass of 2.5 cm was also excised and sent to Pathology for review. All five excision sites were inspected. There was no evidence of gross or palpable disease remaining. All five were irrigated. All five were anesthetized with 0.25% Marcaine. All five incisions were closed with interrupted deep dermal 3-0 Vicryl sutures. All five incisions were closed with skin clips and covered with 4x4s and Tegaderm. The patient tolerated the procedure well. There were no immediate complications.       Eder Goode MD [id number]  D:  06/07/2022 12:13  T:  06/07/2022 16:14  JOB #:  [id number]

## 2022-06-07 NOTE — ANESTHESIA PRE PROCEDURE
Department of Anesthesiology  Preprocedure Note       Name:  Rosette Rosa   Age:  42 y.o.  :  1980                                          MRN:  [id number]         Date:  2022      Surgeon: Fernanda Ha):  Abiodun Estevez MD    Procedure: Procedure(s):  MASS EXCISION OF BACK  x 5/PRONE    Medications prior to admission:   Prior to Admission medications    Medication Sig Start Date End Date Taking? Authorizing Provider   Multiple Vitamins-Minerals (MULTIVITAMIN ADULTS PO) Take by mouth daily   Yes Historical Provider, MD   Meloxicam (MOBIC PO) Take by mouth daily   Yes Historical Provider, MD   NONFORMULARY daily Magnalight supplement for Joint Pain   Yes Historical Provider, MD   albuterol sulfate  (90 Base) MCG/ACT inhaler Inhale into the lungs as needed   Patient not taking: Reported on 2022    Ar Automatic Reconciliation   albuterol (PROVENTIL) (5 MG/ML) 0.5% nebulizer solution Inhale 2.5 mg into the lungs as needed   Patient not taking: Reported on 2022    Ar Automatic Reconciliation   Cetirizine HCl 10 MG CAPS Take by mouth nightly     Ar Automatic Reconciliation   eletriptan (RELPAX) 40 MG tablet [place] one at onset of headache may repeat in 2 hours if necessary 6/10/21   Ar Automatic Reconciliation   eszopiclone (LUNESTA) 1 MG TABS Take 1 mg by mouth nightly.   21   Ar Automatic Reconciliation   fluticasone (FLOVENT HFA) 220 MCG/ACT inhaler Inhale into the lungs as needed   Patient not taking: Reported on 2022    Ar Automatic Reconciliation   ketorolac (TORADOL) 10 MG tablet Take by mouth as needed    Ar Automatic Reconciliation   montelukast (SINGULAIR) 10 MG tablet Take 10 mg by mouth nightly     Ar Automatic Reconciliation   omeprazole (PRILOSEC OTC) 20 MG tablet Take 20 mg by mouth as needed     Ar Automatic Reconciliation   traZODone (DESYREL) 50 MG tablet Take by mouth nightly     Ar Automatic Reconciliation       Current medications:    Current Facility-Administered Medications   Medication Dose Route Frequency Provider Last Rate Last Admin    fentaNYL (SUBLIMAZE) injection 100 mcg  100 mcg IntraVENous Once PRN Magalis Barboza MD        lactated ringers infusion   IntraVENous Continuous Magalis Barboza  mL/hr at 06/07/22 0831 New Bag at 06/07/22 0831    sodium chloride flush 0.9 % injection 5-40 mL  5-40 mL IntraVENous 2 times per day Magalis Barboza MD        sodium chloride flush 0.9 % injection 5-40 mL  5-40 mL IntraVENous PRN Magalis Barboza MD        midazolam PF (VERSED) injection 2 mg  2 mg IntraVENous Once PRN Magalis Barboza MD           Allergies: Allergies   Allergen Reactions    Latex Hives    Shellfish-Derived Products Anaphylaxis    Chocolate Hives    Mushroom Extract Complex Hives       Problem List:    Patient Active Problem List   Diagnosis Code    Mass on back R22.2       Past Medical History:        Diagnosis Date    Arthritis     osteo    Asthma     last attack 10/2018    Chronic pain     GERD (gastroesophageal reflux disease)     controlled with medication    GERD (gastroesophageal reflux disease)     Insomnia     Low back pain     Migraines     Morbid obesity ([address]     BMI 46    Otitis media     PONV (postoperative nausea and vomiting)     small amount of Nausea post-op    Seasonal allergies     Sleep apnea     complaint with CPAP       Past Surgical History:        Procedure Laterality Date    BREAST REDUCTION SURGERY Bilateral 4/15/2019    BILATERAL BREAST REDUCTION performed [facility]    CHOLECYSTECTOMY  2015    ANH AND BSO  2016    WISDOM TOOTH EXTRACTION         Social History:    Social History     Tobacco Use    Smoking status: Never Smoker    Smokeless tobacco: Never Used   Substance Use Topics    Alcohol use:  Yes     Alcohol/week: 1.0 standard drink     Comment: occassionally                                Counseling given: Not Answered      Vital Signs (Current):   Vitals:    06/03/22 1436 06/07/22 0835   BP:  (!) 118/59   Pulse:  83   Resp:  16   Temp:  97.8 °F (36.6 °C)   TempSrc:  Oral   SpO2:  100%   Weight: 295 lb (133.8 kg) 298 lb (135.2 kg)   Height: 5' 7\" (1.702 m) 5' 7\" (1.702 m)                                              BP Readings from Last 3 Encounters:   06/07/22 (!) 118/59   05/16/22 136/86   06/10/21 128/85       NPO Status: Time of last liquid consumption: 0000                        Time of last solid consumption: 0000                        Date of last liquid consumption: 06/06/22                        Date of last solid food consumption: 06/06/22    BMI:   Wt Readings from Last 3 Encounters:   06/07/22 298 lb (135.2 kg)   05/16/22 299 lb (135.6 kg)   06/10/21 (!) 325 lb (147.4 kg)     Body mass index is 46.67 kg/m². CBC: No results found for: WBC, RBC, HGB, HCT, MCV, RDW, PLT    CMP: No results found for: NA, K, CL, CO2, BUN, CREATININE, GFRAA, AGRATIO, LABGLOM, GLUCOSE, GLU, PROT, CALCIUM, BILITOT, ALKPHOS, AST, ALT    POC Tests: No results for input(s): POCGLU, POCNA, POCK, POCCL, POCBUN, POCHEMO, POCHCT in the last 72 hours.     Coags: No results found for: PROTIME, INR, APTT    HCG (If Applicable): No results found for: PREGTESTUR, PREGSERUM, HCG, HCGQUANT     ABGs: No results found for: PHART, PO2ART, TOB4YAS, KPQ7NVQ, BEART, D0CJBIPB     Type & Screen (If Applicable):  No results found for: LABABO, LABRH    Drug/Infectious Status (If Applicable):  No results found for: HIV, HEPCAB    COVID-19 Screening (If Applicable): No results found for: COVID19        Anesthesia Evaluation  Patient summary reviewed and Nursing notes reviewed no history of anesthetic complications:   Airway: Mallampati: II  TM distance: >3 FB   Neck ROM: full     Dental:          Pulmonary: breath sounds clear to auscultation  (+) sleep apnea: on CPAP,  asthma:                            Cardiovascular:  Exercise tolerance: good (>4 METS),           Rhythm: regular  Rate: normal                    Neuro/Psych:   (+) headaches:,             GI/Hepatic/Renal:   (+) GERD:, morbid obesity          Endo/Other:                     Abdominal:             Vascular: Other Findings:           Anesthesia Plan      general     ASA 3       Induction: intravenous. Anesthetic plan and risks discussed with patient.                         Sandrine Carpenter MD   6/7/2022

## 2022-06-14 NOTE — PROGRESS NOTES
H&P/Consult Note/Progress Note/Office Note:   Flora Salas  MRN: [id number]  :1980  Age:41 y.o.    HPI: Flora Salas is a 42 y.o. female who is here for back mass excisions x 5 on 22. Prior to surgery she was referred in consultation by Dr. Bernice Sever for evaluation of soft tissue masses of the back.     She reported soft tissue masses of the back which were present for about a year and slowly getting bigger in size. Nothing particular made them better or worse. A few of these were tender to palpation. No associated fevers. BMI>46  She wanted them excised. Past Medical History:   Diagnosis Date    Arthritis     osteo    Asthma     last attack 10/2018    Chronic pain     GERD (gastroesophageal reflux disease)     controlled with medication    GERD (gastroesophageal reflux disease)     Insomnia     Low back pain     Migraines     Morbid obesity (HCC)     BMI 46    Otitis media     PONV (postoperative nausea and vomiting)     small amount of Nausea post-op    Seasonal allergies     Sleep apnea     complaint with CPAP     Past Surgical History:   Procedure Laterality Date    BREAST REDUCTION SURGERY Bilateral 4/15/2019    BILATERAL BREAST REDUCTION performed by Jena Santos MD at [address] [place] [address]  2015    SKIN BIOPSY N/A 2022    MASS EXCISION OF BACK  x 5/PRONE performed by Adamaris W[facility] at [facility] MA[facility]    ANH AND BSO (CERVIX REMOVED)  2016    WISDOM TOOTH EXTRACTION       Current Outpatient Medications   Medication Sig    HYDROcodone-acetaminophen (NORCO) 5-325 MG per tablet Take 1-2 tablets by mouth every 8 hours as needed for Pain for up to 7 days.     Multiple Vitamins-Minerals (MULTIVITAMIN ADULTS PO) Take by mouth daily    Meloxicam (MOBIC PO) Take by mouth daily    NONFORMULARY daily Magnalight supplement for Joint Pain    albuterol sulfate  (90 Base) MCG/ACT inhaler Inhale into the lungs as needed  (Patient not taking: Reported on 6/7/2022)    albuterol (PROVENTIL) (5 MG/ML) 0.5% nebulizer solution Inhale 2.5 mg into the lungs as needed  (Patient not taking: Reported on 6/7/2022)    Cetirizine HCl 10 MG CAPS Take by mouth nightly     eletriptan (RELPAX) 40 MG tablet [place] one at onset of headache may repeat in 2 hours if necessary    eszopiclone (LUNESTA) 1 MG TABS Take 1 mg by mouth nightly.  fluticasone (FLOVENT HFA) 220 MCG/ACT inhaler Inhale into the lungs as needed  (Patient not taking: Reported on 6/7/2022)    ketorolac (TORADOL) 10 MG tablet Take by mouth as needed    montelukast (SINGULAIR) 10 MG tablet Take 10 mg by mouth nightly     omeprazole (PRILOSEC OTC) 20 MG tablet Take 20 mg by mouth as needed     traZODone (DESYREL) 50 MG tablet Take by mouth nightly      No current facility-administered medications for this visit. Latex, Shellfish-derived products, Chocolate, and Mushroom extract complex  Social History     Socioeconomic History    Marital status: Single     Spouse name: Not on file    Number of children: Not on file    Years of education: Not on file    Highest education level: Not on file   Occupational History    Not on file   Tobacco Use    Smoking status: Never Smoker    Smokeless tobacco: Never Used   Vaping Use    Vaping Use: Never used   Substance and Sexual Activity    Alcohol use: Yes     Alcohol/week: 1.0 standard drink     Comment: occassionally    Drug use: Never    Sexual activity: Not on file   Other Topics Concern    Not on file   Social History Narrative    Not on file     Social Determinants of Health     Financial Resource Strain:     Difficulty of Paying Living Expenses: Not on file   Food Insecurity:     Worried About Running Out of Food in the Last Year: Not on file    Robyn of Food in the Last Year: Not on file   Transportation Needs:     Lack of Transportation (Medical): Not on file    Lack of Transportation (Non-Medical):  Not on file   Physical Activity:     Days of Exercise per Week: Not on file    Minutes of Exercise per Session: Not on file   Stress:     Feeling of Stress : Not on file   Social Connections:     Frequency of Communication with Friends and Family: Not on file    Frequency of Social Gatherings with Friends and Family: Not on file    Attends Voodoo Services: Not on file    Active Member of [address] [place] [address] [employer] or Organizations: Not on file    Attends Club or Organization Meetings: Not on file    Marital Status: Not on file   Intimate Partner Violence:     Fear of Current or Ex-Partner: Not on file    Emotionally Abused: Not on file    Physically Abused: Not on file    Sexually Abused: Not on file   Housing Stability:     Unable to Pay for Housing in the Last Year: Not on file    Number of [place] in the Last Year: Not on file    Unstable Housing in the Last Year: Not on file     Social History     Tobacco Use   Smoking Status Never Smoker   Smokeless Tobacco Never Used     Family History   Problem Relation Age of Onset    Asthma Mother     Heart Attack Father     Bleeding Prob Father     Diabetes Father     Hypertension Father     Cancer Sister         brain ca    No Known Problems Brother     No Known Problems Sister     No Known Problems Brother      ROS: The patient has no difficulty with chest pain or shortness of breath. No fever or chills. Comprehensive review of systems was otherwise unremarkable except as noted above. Physical Exam:   There were no vitals taken for this visit. There were no vitals filed for this visit. No intake/output data recorded. No intake/output data recorded. Constitutional: Alert, oriented, cooperative patient in no acute distress; appears stated age    Eyes:Sclera are clear. EOMs intact  ENMT: no external lesions gross hearing normal; no obvious neck masses, no ear or lip lesions, nares normal  CV: RRR. Normal perfusion  Resp: No JVD.   Breathing is  non-labored; no audible wheezing.       She has 5 staple/incisions on the back. 3 of these involve the upper back in the scapular region on the right. 1 involves the left upper back over medial left scapula. The last lesion is to left of midline in mid back. All staples lines have focal erythema/irritation with inflammation from the staples that were all removed. She had previous reaction to the tegaderm and this was removed post operatively here by staff. I applied desitin and did not cover with tegaderm or any other adhesive such as steri strips. No overlying skin changes.     GI: soft and non-distended     Musculoskeletal: unremarkable with normal function. No embolic signs or cyanosis. Neuro:  Oriented; moves all 4; no focal deficits  Psychiatric: normal affect and mood, no memory impairment          Recent vitals (if inpt):  No data found. Amount and/or Complexity of Data Reviewed and Analyzed:  I reviewed and analyzed all of the unique labs and radiologic studies that are shown below as well as any that are in the HPI, and any that are in the expanded problem list below  *Each unique test, order, or document contributes to the combination of 2 or combination of 3 in Category 1 below. For this visit I also reviewed old records and prior notes. No results for input(s): WBC, HGB, PLT, NA, K, CL, CO2, BUN, CREA, GLU, INR, APTT, ALT, AML, AML, LCAD, PCO2, PO2, HCO3 in the last 72 hours.     Invalid input(s): PTP, TBIL, TBILI, CBIL, SGOT, GPT, AP, LPSE, NH4, TROPT, TROIQ,  PH  Review of most recent CBC  No results found for: WBC, HGB, HCT, MCV, PLT    Review of most recent BMP  No results found for: NA, K, CL, CO2, BUN, CREATININE, GLUCOSE, CALCIUM     Review of most recent LFTs (and lipase if done)  No results found for: ALT, AST, GGT, ALKPHOS, BILITOT  No results found for: LIPASE    No results found for: INR, APTT, LCAD    Review of most recent HgbA1c  No results found for: LABA1C  No results found for: EXTREMITY LEFT SHERWIN 08/05/2019    Narrative  Left lower extremity venous ultrasound    INDICATION:  Pain and swelling,    Doppler ultrasound of the left lower extremity was performed. FINDINGS:  There is normal flow in the greater saphenous, common femoral,  superficial femoral, and popliteal veins. Normal compression and augmentation is  demonstrated. The proximal calf veins are also patent. There is a 5 cm left  popliteal cyst.    Impression  IMPRESSION:  1. No evidence of deep venous thrombosis in the left lower extremity. 2.  Left popliteal cyst      Admission date (for inpatients): (Not on file)   * No surgery date entered *  Procedure(s):  MASS EXCISION OF BACK  x 5/PRONE        ASSESSMENT/PLAN:    Active Problems:    * No active hospital problems. *  Resolved Problems:    * No resolved hospital problems. *     Patient Active Problem List    Diagnosis Date Noted    s/p 5 back masses (benign lipomas) excised on 6/7/22 05/16/2022 6/7/22 s/p back mass excisions x 5; Dr Aimee Fuller  DIAGNOSIS   A:  \"RIGHT UPPER BACK, SOFT TISSUE MASS, LATERAL\": LIPOMA   B:  \"RIGHT UPPER BACK, SOFT TISSUE MASS, SUPERIOR\": LIPOMA   C:  \"RIGHT UPPER BACK, SOFT TISSUE MASS, INFERIOR\":  LIPOMA   D:  \"LEFT MID BACK, SOFT TISSUE MASS\": LIPOMA   E:  \"LEFT UPPER BACK, SOFT TISSUE MASS\": LIPOMA   Sign Out Date: 6/8/2022  VITALY Ceja M.D. Number and Complexity of Problems addressed and   Risks of complications and/or morbidity of management        Multiple soft tissue masses of the back  She is s/p back mass excisions x 5 on 6/7/22 all pathology demonstrates Lipoma  Siddharth out 6/15 with Desitin applied for focal inflammation, erythema and irritation to the healed incisions. Prior reaction to Tegaderm. Today Desitin applied and no covering. She will ask family member to monitor the incision sites daily and she will contact the office should any concern develop.                           Level of MDM (2/3 elements below)  Number and Complexity of Problems Addressed Amount and/or Complexity of Data to be Reviewed and Analyzed  *Each unique test, order, or document contributes to the combination of 2 or combination of 3 in Category 1 below.  Risk of Complications and/or Morbidity or Mortality of pt Management     16218  94831 SF Minimal  1self-limited or minor problem Minimal or none Minimal risk of morbidity from additional diagnostic testing or Rx   61164  04230 Low Low  2or more self-limited or minor problems;    or  1stable chronic illness;    or  5MGBEC, uncomplicated illness or injury   Limited  (Must meet the requirements of at least 1 of the 2 categories)  Category 1: Tests and documents   Any combination of 2 from the following:  Review of prior external note(s) from each unique source*;  review of the result(s) of each unique test*;   ordering of each unique test*    or   Category 2: Assessment requiring an independent historian(s)  (For the categories of independent interpretation of tests and discussion of management or test interpretation, see moderate or high) Low risk of morbidity from additional diagnostic testing or treatment     22027  55915 Mod Moderate  1or more chronic illnesses with exacerbation, progression, or side effects of treatment;    or  2or more stable chronic illnesses;    or  1undiagnosed new problem with uncertain prognosis;    or  1acute illness with systemic symptoms;    or  4CJVCW complicated injury   Moderate  (Must meet the requirements of at least 1 out of 3 categories)  Category 1: Tests, documents, or independent historian(s)  Any combination of 3 from the following:   Review of prior external note(s) from each unique source*;  Review of the result(s) of each unique test*;  Ordering of each unique test*;  Assessment requiring an independent historian(s)    or  Category 2: Independent interpretation of tests   Independent interpretation of a test performed by another physician/other qualified health care professional (not separately reported);     or  Category 3: Discussion of management or test interpretation  Discussion of management or test interpretation with external physician/other qualified health care professional/appropriate source (not separately reported)   Moderate risk of morbidity from additional diagnostic testing or treatment  Examples only:  Prescription drug management   Decision regarding minor surgery with identified patient or procedure risk factors  Decision regarding elective major surgery without identified patient or procedure risk factors   Diagnosis or treatment significantly limited by social determinants of health       36931  39510 High High  1or more chronic illnesses with severe exacerbation, progression, or side effects of treatment;    or  1 acute or chronic illness or injury that poses a threat to life or bodily function   Extensive  (Must meet the requirements of at least 2 out of 3 categories)  Category 1: Tests, documents, or independent historian(s)  Any combination of 3 from the following:   Review of prior external note(s) from each unique source*;  Review of the result(s) of each unique test*;   Ordering of each unique test*;   Assessment requiring an independent historian(s)    or   Category 2: Independent interpretation of tests   Independent interpretation of a test performed by another physician/other qualified health care professional (not separately reported);     or  Category 3: Discussion of management or test interpretation  Discussion of management or test interpretation with external physician/other qualified health care professional/appropriate source (not separately reported)   High risk of morbidity from additional diagnostic testing or treatment  Examples only:  Drug therapy requiring intensive monitoring for toxicity  Decision regarding elective major surgery with identified patient or procedure

## 2022-06-15 ENCOUNTER — OFFICE VISIT (OUTPATIENT)
Dept: SURGERY | Age: 42
End: 2022-06-15

## 2022-06-15 VITALS — BODY MASS INDEX: 45.99 KG/M2 | WEIGHT: 293 LBS | HEIGHT: 67 IN

## 2022-06-15 DIAGNOSIS — R22.2 MASS ON BACK: Primary | ICD-10-CM

## 2022-06-16 ENCOUNTER — OFFICE VISIT (OUTPATIENT)
Dept: SURGERY | Age: 42
End: 2022-06-16

## 2022-06-16 DIAGNOSIS — R22.2 MASS ON BACK: Primary | ICD-10-CM

## 2022-06-16 NOTE — PROGRESS NOTES
Patient here for wound check. She was seen yesterday when staples were removed. Top left back incision with superficial opening along incision approx 3mm. Clear serous oozing, no bleeding or purulent drainage. Focal erythema seen yesterday much improved with Desitin application yesterday. Wound approximated with 1/2 in steri strip. Instructed to keep area clean and dry. Remaining incisions are intact, no wound dehiscence with focal erythema also improved since yesterday. As previously mentioned in yesterday's office note- no steri strips applied as all incisions were treated with heavy application of Desitin- she had adhesive reaction from previous tegaderm and also focal reaction with marked inflammation/redness from staples. Elected no steri strips to wounds yesterday. She will continue to monitor and call for any concerns. Appt given to see her next week 6/22 @ 13:00.        Signed: CHRIS Lucas CNP  6/16/2022    1:38 PM

## 2022-06-22 ENCOUNTER — OFFICE VISIT (OUTPATIENT)
Dept: SURGERY | Age: 42
End: 2022-06-22

## 2022-06-22 DIAGNOSIS — R22.2 MASS ON BACK: Primary | ICD-10-CM

## 2022-06-22 NOTE — PROGRESS NOTES
Patient here for wound check. Top left back incision with superficial opening along incision approx 3mm is filling in with dry granulation. Instructed to keep area clean and dry and covered until granulation fills in and eschar sloughs. Remaining incisions are intact, no wound dehiscence. As previously mentioned in office notes-  she had adhesive reaction from previous tegaderm and also focal reaction with marked inflammation/redness from staples. Follow up prn unless concerns arise. Patient verbalized understanding.       Signed: CHRIS Villarreal CNP  6/22/2022    2:29 PM

## 2022-06-29 ENCOUNTER — CLINICAL DOCUMENTATION (OUTPATIENT)
Dept: SURGERY | Age: 42
End: 2022-06-29

## 2022-06-29 RX ORDER — SODIUM CHLORIDE 0.9 % (FLUSH) 0.9 %
SYRINGE (ML) INJECTION
Qty: 10 ML | Refills: 0 | OUTPATIENT
Start: 2022-06-29

## 2022-06-29 RX ORDER — CEPHALEXIN 250 MG/1
500 CAPSULE ORAL 2 TIMES DAILY
Qty: 20 CAPSULE | Refills: 0 | Status: SHIPPED | OUTPATIENT
Start: 2022-06-29 | End: 2022-07-09

## 2022-06-29 RX ORDER — FLUCONAZOLE 150 MG/1
150 TABLET ORAL ONCE
Qty: 1 TABLET | Refills: 0 | Status: SHIPPED | OUTPATIENT
Start: 2022-06-29 | End: 2022-06-29

## 2022-06-29 NOTE — PROGRESS NOTES
Patient sent pictures of left back incision that has opened more and now has drainage. She was last seen 6/22/22 - that wound with a superficial opening along incision approx 3mm is filling in with dry granulation. She had severe erythema with incisional edema with reaction to the surgical tegaderm (that she removed herself prior to her first post op visit)  post operatively, likely related to the surgical clips. Instructed to keep area clean and dry and covered until granulation fills in and eschar sloughs. She was asked to report to the office today to be seen but could not as she had Kenny office meeting. \"      She denies using neosporin, peroxide on the wound and has been keeping it clean and dry with soap and water as recommended. Covered with dry gauze. Remaining incisions are intact. Patient works as a - she is unable to limit her upper body activity/motion to assist with wound healing. As previously mentioned in office notes-  she had adhesive reaction from previous tegaderm and also focal reaction with marked inflammation/redness from surgical clips. Plan:  Moist to dry dressing to incision BID  Keflex 500mg po BID x 10 days electronically to pharmacy   Patient asked for Diflucan 150 mg po (to use prn if antibiotic causes yeast infection). Provided. Follow up:  Next week with Dr Lula Corcoran or Renaldo Edmonds   Following week with me.         Signed: CHRIS Farrar CNP  6/29/2022    10:59 AM

## 2022-07-06 ENCOUNTER — OFFICE VISIT (OUTPATIENT)
Dept: SURGERY | Age: 42
End: 2022-07-06

## 2022-07-06 VITALS — HEIGHT: 67 IN | WEIGHT: 293 LBS | BODY MASS INDEX: 45.99 KG/M2

## 2022-07-06 DIAGNOSIS — R22.2 MASS ON BACK: Primary | ICD-10-CM

## 2022-07-06 PROCEDURE — 99024 POSTOP FOLLOW-UP VISIT: CPT | Performed by: SURGERY

## 2022-07-06 NOTE — PROGRESS NOTES
mouth daily    Meloxicam (MOBIC PO) Take by mouth daily    NONFORMULARY daily Magnalight supplement for Joint Pain    albuterol sulfate  (90 Base) MCG/ACT inhaler Inhale into the lungs as needed     albuterol (PROVENTIL) (5 MG/ML) 0.5% nebulizer solution Inhale 2.5 mg into the lungs as needed     Cetirizine HCl 10 MG CAPS Take by mouth nightly     eletriptan (RELPAX) 40 MG tablet [place] one at onset of headache may repeat in 2 hours if necessary    eszopiclone (LUNESTA) 1 MG TABS Take 1 mg by mouth nightly.  fluticasone (FLOVENT HFA) 220 MCG/ACT inhaler Inhale into the lungs as needed     ketorolac (TORADOL) 10 MG tablet Take by mouth as needed    montelukast (SINGULAIR) 10 MG tablet Take 10 mg by mouth nightly     omeprazole (PRILOSEC OTC) 20 MG tablet Take 20 mg by mouth as needed     traZODone (DESYREL) 50 MG tablet Take by mouth nightly      No current facility-administered medications for this visit. Latex, Shellfish-derived products, Chocolate, and Mushroom extract complex  Social History     Socioeconomic History    Marital status: Single     Spouse name: Not on file    Number of children: Not on file    Years of education: Not on file    Highest education level: Not on file   Occupational History    Not on file   Tobacco Use    Smoking status: Never Smoker    Smokeless tobacco: Never Used   Vaping Use    Vaping Use: Never used   Substance and Sexual Activity    Alcohol use:  Yes     Alcohol/week: 1.0 standard drink     Comment: occassionally    Drug use: Never    Sexual activity: Not on file   Other Topics Concern    Not on file   Social History Narrative    Not on file     Social Determinants of Health     Financial Resource Strain:     Difficulty of Paying Living Expenses: Not on file   Food Insecurity:     Worried About Running Out of Food in the Last Year: Not on file    Robyn of Food in the Last Year: Not on file   Transportation Needs:     Lack of Transportation (Medical): Not on file    Lack of Transportation (Non-Medical): Not on file   Physical Activity:     Days of Exercise per Week: Not on file    Minutes of Exercise per Session: Not on file   Stress:     Feeling of Stress : Not on file   Social Connections:     Frequency of Communication with Friends and Family: Not on file    Frequency of Social Gatherings with Friends and Family: Not on file    Attends Gnosticism Services: Not on file    Active Member of [address] [place] [address] [employer] or Organizations: Not on file    Attends Club or Organization Meetings: Not on file    Marital Status: Not on file   Intimate Partner Violence:     Fear of Current or Ex-Partner: Not on file    Emotionally Abused: Not on file    Physically Abused: Not on file    Sexually Abused: Not on file   Housing Stability:     Unable to Pay for Housing in the Last Year: Not on file    Number of [place] in the Last Year: Not on file    Unstable Housing in the Last Year: Not on file     Social History     Tobacco Use   Smoking Status Never Smoker   Smokeless Tobacco Never Used     Family History   Problem Relation Age of Onset    Asthma Mother     Heart Attack Father     Bleeding Prob Father     Diabetes Father     Hypertension Father     Cancer Sister         brain ca    No Known Problems Brother     No Known Problems Sister     No Known Problems Brother      ROS: The patient has no difficulty with chest pain or shortness of breath. No fever or chills. Comprehensive review of systems was otherwise unremarkable except as noted above. Physical Exam:   There were no vitals taken for this visit. There were no vitals filed for this visit. No intake/output data recorded. No intake/output data recorded. Constitutional: Alert, oriented, cooperative patient in no acute distress; appears stated age    Eyes:Sclera are clear.  EOMs intact  ENMT: no external lesions gross hearing normal; no obvious neck masses, no ear or lip lesions, nares normal  CV: RRR. Normal perfusion  Resp: No JVD. Breathing is  non-labored; no audible wheezing.       4 Healed incisions of back  3 of these involve the upper back in the scapular region on the right  1 involves the surgical incision to the left of midline in the mid back      1 involve the left upper back over the medial left scapula-this has  and is open and being packed      No residual or recurrent back masses  No overlying skin changes.     GI: soft and non-distended     Musculoskeletal: unremarkable with normal function. No embolic signs or cyanosis. Neuro:  Oriented; moves all 4; no focal deficits  Psychiatric: normal affect and mood, no memory impairment          Recent vitals (if inpt):  No data found. Amount and/or Complexity of Data Reviewed and Analyzed:  I reviewed and analyzed all of the unique labs and radiologic studies that are shown below as well as any that are in the HPI, and any that are in the expanded problem list below  *Each unique test, order, or document contributes to the combination of 2 or combination of 3 in Category 1 below. For this visit I also reviewed old records and prior notes. No results for input(s): WBC, HGB, PLT, NA, K, CL, CO2, BUN, CREA, GLU, INR, APTT, ALT, AML, AML, LCAD, PCO2, PO2, HCO3 in the last 72 hours.     Invalid input(s): PTP, TBIL, TBILI, CBIL, SGOT, GPT, AP, LPSE, NH4, TROPT, TROIQ,  PH  Review of most recent CBC  No results found for: WBC, HGB, HCT, MCV, PLT    Review of most recent BMP  No results found for: NA, K, CL, CO2, BUN, CREATININE, GLUCOSE, CALCIUM     Review of most recent LFTs (and lipase if done)  No results found for: ALT, AST, GGT, ALKPHOS, BILITOT  No results found for: LIPASE    No results found for: INR, APTT, LCAD    Review of most recent HgbA1c  No results found for: LABA1C  No results found for: EAG    Nutritional assessment screen for wound healing issues:  No results found for: LABPROT, ultrasound    INDICATION:  Pain and swelling,    Doppler ultrasound of the left lower extremity was performed. FINDINGS:  There is normal flow in the greater saphenous, common femoral,  superficial femoral, and popliteal veins. Normal compression and augmentation is  demonstrated. The proximal calf veins are also patent. There is a 5 cm left  popliteal cyst.    Impression  IMPRESSION:  1. No evidence of deep venous thrombosis in the left lower extremity. 2.  Left popliteal cyst      Admission date (for inpatients): (Not on file)   * No surgery date entered *  Procedure(s):  MASS EXCISION OF BACK  x 5/PRONE        ASSESSMENT/PLAN:  [unfilled]  Active Problems:    * No active hospital problems. *  Resolved Problems:    * No resolved hospital problems. *     Patient Active Problem List    Diagnosis Date Noted    s/p 5 back masses (benign lipomas) excised on 6/7/22 05/16/2022 6/7/22 s/p back mass excisions x 5; Dr Marco Antonio Gonzalez  DIAGNOSIS   A:  \"RIGHT UPPER BACK, SOFT TISSUE MASS, LATERAL\": LIPOMA   B:  \"RIGHT UPPER BACK, SOFT TISSUE MASS, SUPERIOR\": LIPOMA   C:  \"RIGHT UPPER BACK, SOFT TISSUE MASS, INFERIOR\":  LIPOMA   D:  \"LEFT MID BACK, SOFT TISSUE MASS\": LIPOMA   E:  \"LEFT UPPER BACK, SOFT TISSUE MASS\": LIPOMA   Sign Out Date: 6/8/2022  VITALY Jamison M.D. Number and Complexity of Problems addressed and   Risks of complications and/or morbidity of management        Multiple soft tissue masses of the back  She is s/p back mass excisions x 5 on 6/7/22.   Her pathology report identified benign lipomas    Saline moistened 2 x 2 gauze packing into left upper back open wound until this granulates and heals  Follow-up with Joel Banks NP every few weeks until completely resolved  Then see us as needed                  Level of MDM (2/3 elements below)  Number and Complexity of Problems Addressed Amount and/or Complexity of Data to be Reviewed and Analyzed  *Each unique test, order, or document contributes to the combination of 2 or combination of 3 in Category 1 below.  Risk of Complications and/or Morbidity or Mortality of pt Management     97137  [id number] SF Minimal  1self-limited or minor problem Minimal or none Minimal risk of morbidity from additional diagnostic testing or Rx   [id number]  [id number] Low Low  2or more self-limited or minor problems;    or  1stable chronic illness;    or  3[facility], uncomplicated illness or injury   Limited  (Must meet the requirements of at least 1 of the 2 categories)  Category 1: Tests and documents   Any combination of 2 from the following:  Review of prior external note(s) from each unique source*;  review of the result(s) of each unique test*;   ordering of each unique test*    or   Category 2: Assessment requiring an independent historian(s)  (For the categories of independent interpretation of tests and discussion of management or test interpretation, see moderate or high) Low risk of morbidity from additional diagnostic testing or treatment     59963  26749 Mod Moderate  1or more chronic illnesses with exacerbation, progression, or side effects of treatment;    or  2or more stable chronic illnesses;    or  1undiagnosed new problem with uncertain prognosis;    or  1acute illness with systemic symptoms;    or  0CEBYQ complicated injury   Moderate  (Must meet the requirements of at least 1 out of 3 categories)  Category 1: Tests, documents, or independent historian(s)  Any combination of 3 from the following:   Review of prior external note(s) from each unique source*;  Review of the result(s) of each unique test*;  Ordering of each unique test*;  Assessment requiring an independent historian(s)    or  Category 2: Independent interpretation of tests   Independent interpretation of a test performed by another physician/other qualified health care professional (not separately reported);     or  Category 3: Discussion of management or test interpretation  Discussion of management or test interpretation with external physician/other qualified health care professional/appropriate source (not separately reported)   Moderate risk of morbidity from additional diagnostic testing or treatment  Examples only:  Prescription drug management   Decision regarding minor surgery with identified patient or procedure risk factors  Decision regarding elective major surgery without identified patient or procedure risk factors   Diagnosis or treatment significantly limited by social determinants of health       57034  03526 High High  1or more chronic illnesses with severe exacerbation, progression, or side effects of treatment;    or  1 acute or chronic illness or injury that poses a threat to life or bodily function   Extensive  (Must meet the requirements of at least 2 out of 3 categories)  Category 1: Tests, documents, or independent historian(s)  Any combination of 3 from the following:   Review of prior external note(s) from each unique source*;  Review of the result(s) of each unique test*;   Ordering of each unique test*;   Assessment requiring an independent historian(s)    or   Category 2: Independent interpretation of tests   Independent interpretation of a test performed by another physician/other qualified health care professional (not separately reported);     or  Category 3: Discussion of management or test interpretation  Discussion of management or test interpretation with external physician/other qualified health care professional/appropriate source (not separately reported)   High risk of morbidity from additional diagnostic testing or treatment  Examples only:  Drug therapy requiring intensive monitoring for toxicity  Decision regarding elective major surgery with identified patient or procedure risk factors  Decision regarding emergency major surgery  Decision regarding hospitalization  Decision not to resuscitate or to de-escalate care because of poor prognosis             I have personally performed a face-to-face diagnostic evaluation and management  service on this patient. I have independently seen the patient. I have independently obtained the above history from the patient/family. I have independently examined the patient with above findings. I have independently reviewed data/labs for this patient and developed the above plan of care (MDM).       Signed: Emma Shi MD  7/6/2022    11:55 AM

## 2022-07-12 ENCOUNTER — TELEPHONE (OUTPATIENT)
Dept: NEUROLOGY | Age: 42
End: 2022-07-12

## 2022-07-18 ENCOUNTER — OFFICE VISIT (OUTPATIENT)
Dept: SURGERY | Age: 42
End: 2022-07-18

## 2022-07-18 VITALS — HEIGHT: 67 IN | WEIGHT: 293 LBS | BODY MASS INDEX: 45.99 KG/M2

## 2022-07-18 DIAGNOSIS — R22.2 MASS ON BACK: Primary | ICD-10-CM

## 2022-07-18 NOTE — PROGRESS NOTES
H&P/Consult Note/Progress Note/Office Note:   Vita Osorio  MRN: [id number]  :1980  Age:41 y.o.    HPI: Vita Osorio is a 42 y.o. female who is s/p multiple (5) back mass excisions on 22. Her pathology report shown below and expanded problem list identified 5 lipomas. Prior to surgery she was referred in consultation by Dr. Kylee Izaguirre for evaluation of soft tissue masses of the back. She reported soft tissue masses of the back which were present for about a year and slowly getting bigger in size. Nothing particular made them better or worse. A few of these were tender to palpation. No associated fevers. BMI>46  She wanted them excised.       Past Medical History:   Diagnosis Date    Arthritis     osteo    Asthma     last attack 10/2018    Chronic pain     GERD (gastroesophageal reflux disease)     controlled with medication    GERD (gastroesophageal reflux disease)     Insomnia     Low back pain     Migraines     Morbid obesity (HCC)     BMI 46    Otitis media     PONV (postoperative nausea and vomiting)     small amount of Nausea post-op    Seasonal allergies     Sleep apnea     complaint with CPAP     Past Surgical History:   Procedure Laterality Date    BREAST REDUCTION SURGERY Bilateral 4/15/2019    BILATERAL BREAST REDUCTION performed by Rosamaria Mcdonald MD at [facility]      SKIN BIOPSY N/A 2022    MASS EXCISION OF BACK  x 5/PRONE performed by Estrada Malik MD at [address] [place] [address] (CERVIX REMOVED)  2016    WISDOM TOOTH EXTRACTION       Current Outpatient Medications   Medication Sig    Sodium Chloride Flush (SALINE FLUSH) 0.9 % SOLN Pre filled Normal saline syringe flushes 10ml  Provide approximately 20 or 1 box  To be used for dressing changes daily as directed    Multiple Vitamins-Minerals (MULTIVITAMIN ADULTS PO) Take by mouth daily    Meloxicam (MOBIC PO) Take by mouth daily    NONFORMULARY daily Magnalight supplement for Joint kg/m²   Vitals:    07/18/22 1437   Weight: 298 lb (135.2 kg)   Height: 5' 7\" (1.702 m)     No intake/output data recorded. No intake/output data recorded. Constitutional: Alert, oriented, cooperative patient in no acute distress; appears stated age    Eyes:Sclera are clear. EOMs intact  ENMT: no external lesions gross hearing normal; no obvious neck masses, no ear or lip lesions, nares normal  CV: RRR. Normal perfusion  Resp: No JVD. Breathing is  non-labored; no audible wheezing. 4 Healed incisions of back  3 of these involve the upper back in the scapular region on the right  1 involves the surgical incision to the left of midline in the mid back and in the interim the Right upper back incision has opened. In the interim the Right upper back over the medial Right scapula-this has  and is open with clear serous drainage, small amount. Superficial opening. Left upper back previously opened area now granulated in and healed. No residual or recurrent back masses  No overlying skin changes. GI: soft and non-distended     Musculoskeletal: unremarkable with normal function. No embolic signs or cyanosis. Neuro:  Oriented; moves all 4; no focal deficits  Psychiatric: normal affect and mood, no memory impairment          Recent vitals (if inpt):  No data found. Amount and/or Complexity of Data Reviewed and Analyzed:  I reviewed and analyzed all of the unique labs and radiologic studies that are shown below as well as any that are in the HPI, and any that are in the expanded problem list below  *Each unique test, order, or document contributes to the combination of 2 or combination of 3 in Category 1 below. For this visit I also reviewed old records and prior notes. No results for input(s): WBC, HGB, PLT, NA, K, CL, CO2, BUN, CREA, GLU, INR, APTT, ALT, AML, AML, LCAD, PCO2, PO2, HCO3 in the last 72 hours.     Invalid input(s): PTP, TBIL, TBILI, CBIL, SGOT, GPT, AP, LPSE, NH4, TROPT, TROIQ,  PH  Review of most recent CBC  No results found for: WBC, HGB, HCT, MCV, PLT    Review of most recent BMP  No results found for: NA, K, CL, CO2, BUN, CREATININE, GLUCOSE, CALCIUM     Review of most recent LFTs (and lipase if done)  No results found for: ALT, AST, GGT, ALKPHOS, BILITOT  No results found for: LIPASE    No results found for: INR, APTT, LCAD    Review of most recent HgbA1c  No results found for: LABA1C  No results found for: EAG    Nutritional assessment screen for wound healing issues:  No results found for: LABPROT, LABALBU    @lastcovr@    Xray Result (most recent):  XR CHEST PORTABLE 01/27/2019    Narrative  CHEST X-RAY, single portable view  1/27/2019    History: Fever. Technique: Single frontal view of the chest.    Comparison: None. Findings: The cardiac silhouette is normal in respect to size. The lungs are expanded  without evidence for pneumothorax. No consolidative airspace process or  pleural effusion is seen. Impression  IMPRESSION:  1. No acute cardiopulmonary process evident on single frontal view of the  chest.    CT Result (most recent):  CT CHEST W CONTRAST 01/27/2019    Narrative  CTA OF THE CHEST - PE STUDY    HISTORY: Shortness of breath and tachycardia. COMPARISON: None. TECHNIQUE: A helical acquisition was performed through the chest utilizing  1.05LI slice thickness during the infusion of 100 cc of Isovue-370. 3-D  post-processed images were created on an independent workstation. Multiplanar  reformats were obtained. The exam was focused on the pulmonary arteries. Dose reduction techniques used: Automated exposure control, adjustment of the  mAs and/or kVp according to patient's size, and iterative reconstruction  techniques. FINDINGS:  *  PULMONARY VESSELS: No evidence of pulmonary embolism. *  PLEURA / PERICARDIUM: Within normal limits. *  JOHANNA / MEDIASTINUM: Within normal limits. *  LUNGS: Within normal limits.   * TRACHEOBRONCHIAL TREE: Within normal limits. *  AORTA: Within normal limits. *  CORONARY ARTERIES: No coronary artery calcification is seen. *  CHEST WALL/AXILLA: Within normal limits. *  VISUALIZED UPPER ABDOMEN: Cholecystectomy. *  SPINE / BONES: Within normal limits. *  ADDITIONAL COMMENTS: None. Impression  IMPRESSION:    No evidence of pulmonary embolism. Cholecystectomy. Date of Dictation: 1/27/2019 10:53 PM    US Result (most recent):  US DUP LOWER EXTREMITY LEFT SHERWIN 08/05/2019    Narrative  Left lower extremity venous ultrasound    INDICATION:  Pain and swelling,    Doppler ultrasound of the left lower extremity was performed. FINDINGS:  There is normal flow in the greater saphenous, common femoral,  superficial femoral, and popliteal veins. Normal compression and augmentation is  demonstrated. The proximal calf veins are also patent. There is a 5 cm left  popliteal cyst.    Impression  IMPRESSION:  1. No evidence of deep venous thrombosis in the left lower extremity. 2.  Left popliteal cyst      Admission date (for inpatients): (Not on file)   * No surgery date entered *  Procedure(s):  MASS EXCISION OF BACK  x 5/PRONE        ASSESSMENT/PLAN:  [unfilled]  Active Problems:    * No active hospital problems. *  Resolved Problems:    * No resolved hospital problems. *     Patient Active Problem List    Diagnosis Date Noted    s/p 5 back masses (benign lipomas) excised on 6/7/22 05/16/2022 6/7/22 s/p back mass excisions x 5; Dr Isreal Brown:  Sylvia Rosasle, SOFT TISSUE MASS, LATERAL\": LIPOMA   B:  \"RIGHT UPPER BACK, SOFT TISSUE MASS, SUPERIOR\": LIPOMA   C:  \"RIGHT UPPER BACK, SOFT TISSUE MASS, INFERIOR\":  LIPOMA   D:  \"LEFT MID BACK, SOFT TISSUE MASS\": LIPOMA   E:  \"LEFT UPPER BACK, SOFT TISSUE MASS\": LIPOMA   Sign Out Date: 6/8/2022  VITALY Saenz M.D.              Number and Complexity of Problems addressed and   Risks of complications and/or morbidity of management        Multiple soft tissue masses of the back  She is s/p back mass excisions x 5 on 6/7/22. Her pathology report identified benign lipomas    The left upper wound has granulated in. Skin is excoriated, but she denies using peroxide. I have her 1/4 inch plain packing and gauze. She may cover the packing site with a bandaide as it is very small and superficial.   Follow-up with me every few weeks until completely resolved  Then see us as needed                  Level of MDM (2/3 elements below)  Number and Complexity of Problems Addressed Amount and/or Complexity of Data to be Reviewed and Analyzed  *Each unique test, order, or document contributes to the combination of 2 or combination of 3 in Category 1 below. Risk of Complications and/or Morbidity or Mortality of pt Management     56451  84593 SF Minimal  ?1self-limited or minor problem Minimal or none Minimal risk of morbidity from additional diagnostic testing or Rx   36662  29504 Low Low  ? 2or more self-limited or minor problems;    or  ? 1stable chronic illness;    or  ?1acute, uncomplicated illness or injury   Limited  (Must meet the requirements of at least 1 of the 2 categories)  Category 1: Tests and documents   ? Any combination of 2 from the following:  ?Review of prior external note(s) from each unique source*;  ?review of the result(s) of each unique test*;   ?ordering of each unique test*    or   Category 2: Assessment requiring an independent historian(s)  (For the categories of independent interpretation of tests and discussion of management or test interpretation, see moderate or high) Low risk of morbidity from additional diagnostic testing or treatment     59294  69633 Mod Moderate  ? 1or more chronic illnesses with exacerbation, progression, or side effects of treatment;    or  ?2or more stable chronic illnesses;    or  ?1undiagnosed new problem with uncertain prognosis;    or  ?1acute illness with systemic symptoms;    or  ?1acute complicated injury   Moderate  (Must meet the requirements of at least 1 out of 3 categories)  Category 1: Tests, documents, or independent historian(s)  ? Any combination of 3 from the following:   ?Review of prior external note(s) from each unique source*;  ?Review of the result(s) of each unique test*;  ?Ordering of each unique test*;  ?Assessment requiring an independent historian(s)    or  Category 2: Independent interpretation of tests   ? Independent interpretation of a test performed by another physician/other qualified health care professional (not separately reported);     or  Category 3: Discussion of management or test interpretation  ? Discussion of management or test interpretation with external physician/other qualified health care professional/appropriate source (not separately reported)   Moderate risk of morbidity from additional diagnostic testing or treatment  Examples only:  ?Prescription drug management   ? Decision regarding minor surgery with identified patient or procedure risk factors  ? Decision regarding elective major surgery without identified patient or procedure risk factors   ? Diagnosis or treatment significantly limited by social determinants of health       56557  62025 High High  ? 1or more chronic illnesses with severe exacerbation, progression, or side effects of treatment;    or  ?1 acute or chronic illness or injury that poses a threat to life or bodily function   Extensive  (Must meet the requirements of at least 2 out of 3 categories)  Category 1: Tests, documents, or independent historian(s)  ? Any combination of 3 from the following:   ?Review of prior external note(s) from each unique source*;  ?Review of the result(s) of each unique test*;   ?Ordering of each unique test*;   ?Assessment requiring an independent historian(s)    or   Category 2: Independent interpretation of tests   ? Independent interpretation of a test performed by another physician/other qualified health care professional (not separately reported);     or  Category 3: Discussion of management or test interpretation  ? Discussion of management or test interpretation with external physician/other qualified health care professional/appropriate source (not separately reported)   High risk of morbidity from additional diagnostic testing or treatment  Examples only:  ?Drug therapy requiring intensive monitoring for toxicity  ? Decision regarding elective major surgery with identified patient or procedure risk factors  ? Decision regarding emergency major surgery  ? Decision regarding hospitalization  ? Decision not to resuscitate or to de-escalate care because of poor prognosis             I have personally performed a face-to-face diagnostic evaluation and management  service on this patient. I have independently seen the patient. I have independently obtained the above history from the patient/family. I have independently examined the patient with above findings. I have independently reviewed data/labs for this patient and developed the above plan of care (MDM).       Signed: CHRIS Lane - JEAN  7/18/2022    3:48 PM

## 2024-12-08 ENCOUNTER — APPOINTMENT (OUTPATIENT)
Dept: CT IMAGING | Age: 44
End: 2024-12-08
Payer: COMMERCIAL

## 2024-12-08 ENCOUNTER — HOSPITAL ENCOUNTER (EMERGENCY)
Age: 44
Discharge: HOME | End: 2024-12-08
Attending: EMERGENCY MEDICINE
Payer: COMMERCIAL

## 2024-12-08 VITALS
SYSTOLIC BLOOD PRESSURE: 127 MMHG | HEART RATE: 89 BPM | WEIGHT: 293 LBS | HEIGHT: 67 IN | DIASTOLIC BLOOD PRESSURE: 85 MMHG | RESPIRATION RATE: 18 BRPM | BODY MASS INDEX: 45.99 KG/M2 | TEMPERATURE: 97.7 F | OXYGEN SATURATION: 99 %

## 2024-12-08 DIAGNOSIS — K42.9 UMBILICAL HERNIA WITHOUT OBSTRUCTION AND WITHOUT GANGRENE: Primary | ICD-10-CM

## 2024-12-08 DIAGNOSIS — K52.9 GASTROENTERITIS: ICD-10-CM

## 2024-12-08 LAB
ALBUMIN SERPL-MCNC: 3.4 G/DL (ref 3.5–5)
ALBUMIN/GLOB SERPL: 0.9 (ref 1–1.9)
ALP SERPL-CCNC: 71 U/L (ref 35–104)
ALT SERPL-CCNC: 51 U/L (ref 8–45)
ANION GAP SERPL CALC-SCNC: 10 MMOL/L (ref 7–16)
APPEARANCE UR: CLEAR
AST SERPL-CCNC: 36 U/L (ref 15–37)
BACTERIA URNS QL MICRO: NEGATIVE /HPF
BASOPHILS # BLD: 0 K/UL (ref 0–0.2)
BASOPHILS NFR BLD: 0 % (ref 0–2)
BILIRUB SERPL-MCNC: 0.3 MG/DL (ref 0–1.2)
BILIRUB UR QL: NEGATIVE
BUN SERPL-MCNC: 14 MG/DL (ref 6–23)
CALCIUM SERPL-MCNC: 9.2 MG/DL (ref 8.8–10.2)
CHLORIDE SERPL-SCNC: 103 MMOL/L (ref 98–107)
CO2 SERPL-SCNC: 26 MMOL/L (ref 20–29)
COLOR UR: ABNORMAL
CREAT SERPL-MCNC: 0.9 MG/DL (ref 0.6–1.1)
DIFFERENTIAL METHOD BLD: ABNORMAL
EOSINOPHIL # BLD: 0.1 K/UL (ref 0–0.8)
EOSINOPHIL NFR BLD: 0 % (ref 0.5–7.8)
EPI CELLS #/AREA URNS HPF: ABNORMAL /HPF
ERYTHROCYTE [DISTWIDTH] IN BLOOD BY AUTOMATED COUNT: 13.2 % (ref 11.9–14.6)
GLOBULIN SER CALC-MCNC: 3.8 G/DL (ref 2.3–3.5)
GLUCOSE SERPL-MCNC: 86 MG/DL (ref 70–99)
GLUCOSE UR STRIP.AUTO-MCNC: NEGATIVE MG/DL
HCG UR QL: NEGATIVE
HCT VFR BLD AUTO: 40.6 % (ref 35.8–46.3)
HGB BLD-MCNC: 13.4 G/DL (ref 11.7–15.4)
HGB UR QL STRIP: NEGATIVE
HYALINE CASTS URNS QL MICRO: ABNORMAL /LPF
IMM GRANULOCYTES # BLD AUTO: 0 K/UL (ref 0–0.5)
IMM GRANULOCYTES NFR BLD AUTO: 0 % (ref 0–5)
KETONES UR QL STRIP.AUTO: NEGATIVE MG/DL
LEUKOCYTE ESTERASE UR QL STRIP.AUTO: NEGATIVE
LIPASE SERPL-CCNC: 28 U/L (ref 13–60)
LYMPHOCYTES # BLD: 3.6 K/UL (ref 0.5–4.6)
LYMPHOCYTES NFR BLD: 25 % (ref 13–44)
MCH RBC QN AUTO: 27.7 PG (ref 26.1–32.9)
MCHC RBC AUTO-ENTMCNC: 33 G/DL (ref 31.4–35)
MCV RBC AUTO: 83.9 FL (ref 82–102)
MONOCYTES # BLD: 1 K/UL (ref 0.1–1.3)
MONOCYTES NFR BLD: 7 % (ref 4–12)
NEUTS SEG # BLD: 9.8 K/UL (ref 1.7–8.2)
NEUTS SEG NFR BLD: 68 % (ref 43–78)
NITRITE UR QL STRIP.AUTO: NEGATIVE
NRBC # BLD: 0 K/UL (ref 0–0.2)
PH UR STRIP: 7.5 (ref 5–9)
PLATELET # BLD AUTO: 302 K/UL (ref 150–450)
PMV BLD AUTO: 10.5 FL (ref 9.4–12.3)
POTASSIUM SERPL-SCNC: 4 MMOL/L (ref 3.5–5.1)
PROT SERPL-MCNC: 7.2 G/DL (ref 6.3–8.2)
PROT UR STRIP-MCNC: 30 MG/DL
RBC # BLD AUTO: 4.84 M/UL (ref 4.05–5.2)
RBC #/AREA URNS HPF: ABNORMAL /HPF
SODIUM SERPL-SCNC: 139 MMOL/L (ref 136–145)
SP GR UR REFRACTOMETRY: 1.03 (ref 1–1.02)
UROBILINOGEN UR QL STRIP.AUTO: 1 EU/DL (ref 0.2–1)
WBC # BLD AUTO: 14.4 K/UL (ref 4.3–11.1)
WBC URNS QL MICRO: ABNORMAL /HPF

## 2024-12-08 PROCEDURE — 83690 ASSAY OF LIPASE: CPT

## 2024-12-08 PROCEDURE — 81025 URINE PREGNANCY TEST: CPT

## 2024-12-08 PROCEDURE — 81001 URINALYSIS AUTO W/SCOPE: CPT

## 2024-12-08 PROCEDURE — 96375 TX/PRO/DX INJ NEW DRUG ADDON: CPT

## 2024-12-08 PROCEDURE — 6360000002 HC RX W HCPCS: Performed by: EMERGENCY MEDICINE

## 2024-12-08 PROCEDURE — 85025 COMPLETE CBC W/AUTO DIFF WBC: CPT

## 2024-12-08 PROCEDURE — 99285 EMERGENCY DEPT VISIT HI MDM: CPT

## 2024-12-08 PROCEDURE — 6360000004 HC RX CONTRAST MEDICATION: Performed by: EMERGENCY MEDICINE

## 2024-12-08 PROCEDURE — 74177 CT ABD & PELVIS W/CONTRAST: CPT

## 2024-12-08 PROCEDURE — 80053 COMPREHEN METABOLIC PANEL: CPT

## 2024-12-08 PROCEDURE — 96374 THER/PROPH/DIAG INJ IV PUSH: CPT

## 2024-12-08 RX ORDER — ONDANSETRON 8 MG/1
8 TABLET, ORALLY DISINTEGRATING ORAL EVERY 8 HOURS PRN
Qty: 12 TABLET | Refills: 0 | Status: SHIPPED | OUTPATIENT
Start: 2024-12-08

## 2024-12-08 RX ORDER — MORPHINE SULFATE 4 MG/ML
4 INJECTION, SOLUTION INTRAMUSCULAR; INTRAVENOUS ONCE
Status: DISCONTINUED | OUTPATIENT
Start: 2024-12-08 | End: 2024-12-08 | Stop reason: HOSPADM

## 2024-12-08 RX ORDER — IOPAMIDOL 755 MG/ML
100 INJECTION, SOLUTION INTRAVASCULAR
Status: COMPLETED | OUTPATIENT
Start: 2024-12-08 | End: 2024-12-08

## 2024-12-08 RX ORDER — KETOROLAC TROMETHAMINE 30 MG/ML
30 INJECTION, SOLUTION INTRAMUSCULAR; INTRAVENOUS
Status: DISCONTINUED | OUTPATIENT
Start: 2024-12-08 | End: 2024-12-08 | Stop reason: HOSPADM

## 2024-12-08 RX ORDER — ONDANSETRON 2 MG/ML
4 INJECTION INTRAMUSCULAR; INTRAVENOUS
Status: COMPLETED | OUTPATIENT
Start: 2024-12-08 | End: 2024-12-08

## 2024-12-08 RX ORDER — MORPHINE SULFATE 4 MG/ML
6 INJECTION INTRAVENOUS ONCE
Status: COMPLETED | OUTPATIENT
Start: 2024-12-08 | End: 2024-12-08

## 2024-12-08 RX ORDER — HYOSCYAMINE SULFATE 0.125 MG
0.12 TABLET ORAL EVERY 4 HOURS PRN
Qty: 20 TABLET | Refills: 0 | Status: SHIPPED | OUTPATIENT
Start: 2024-12-08

## 2024-12-08 RX ADMIN — MORPHINE SULFATE 6 MG: 4 INJECTION, SOLUTION INTRAMUSCULAR; INTRAVENOUS at 17:31

## 2024-12-08 RX ADMIN — ONDANSETRON 4 MG: 2 INJECTION, SOLUTION INTRAMUSCULAR; INTRAVENOUS at 17:32

## 2024-12-08 RX ADMIN — IOPAMIDOL 100 ML: 755 INJECTION, SOLUTION INTRAVENOUS at 18:36

## 2024-12-08 ASSESSMENT — PAIN DESCRIPTION - DESCRIPTORS: DESCRIPTORS: STABBING;ACHING

## 2024-12-08 ASSESSMENT — PAIN SCALES - GENERAL
PAINLEVEL_OUTOF10: 8
PAINLEVEL_OUTOF10: 9
PAINLEVEL_OUTOF10: 6

## 2024-12-08 ASSESSMENT — PAIN DESCRIPTION - LOCATION: LOCATION: ABDOMEN

## 2024-12-08 ASSESSMENT — LIFESTYLE VARIABLES
HOW MANY STANDARD DRINKS CONTAINING ALCOHOL DO YOU HAVE ON A TYPICAL DAY: 1 OR 2
HOW OFTEN DO YOU HAVE A DRINK CONTAINING ALCOHOL: 2-4 TIMES A MONTH

## 2024-12-08 ASSESSMENT — PAIN - FUNCTIONAL ASSESSMENT: PAIN_FUNCTIONAL_ASSESSMENT: 0-10

## 2024-12-08 ASSESSMENT — PAIN DESCRIPTION - ORIENTATION: ORIENTATION: RIGHT;UPPER

## 2024-12-08 NOTE — ED PROVIDER NOTES
Emergency Department Provider Note       PCP: Marquis Malagon Jr., MD   Age: 43 y.o.   Sex: female     DISPOSITION Decision To Discharge 12/08/2024 08:11:51 PM    ICD-10-CM    1. Umbilical hernia without obstruction and without gangrene  K42.9 [facility]      2. Gastroenteritis  K52.9           Medical Decision Making     Patient has had some tenderness in epigastrium right upper quadrant.  She is status post cholecystectomy.  Differential diagnosis is broad.  Plan for screening labs and urinalysis.    5:26 PM EST  White blood cell count is elevated at 14,000.  Chemistry panel including LFTs fairly stable other than a mildly elevated ALT of 51.  Nevertheless plan for CT scan of abdomen and pelvis.    8:11 PM EST  CT does show a small fat-containing umbilical hernia    No other acute pathology noted.    I feel that most of her symptoms are likely related to a viral gastroenteritis.  There is no signs of colitis.  Will discuss further outpatient symptomatic treatment.    Referral to general surgery for umbilical hernia.          1 acute illness with systemic symptoms.  1 acute complicated illness or injury.  Prescription drug management performed.  Parental controlled substances given in the ED.  Shared medical decision making was utilized in creating the patients health plan today.  I independently ordered and reviewed each unique test.    I reviewed external records: ED visit note from an outside group.  I reviewed external records: provider visit note from PCP.  I reviewed external records: provider visit note from outside specialist.  I reviewed external records: previous lab results from outside ED.  I reviewed external records: previous imaging study including radiologist interpretation.       I interpreted the CT Scan umbilical hernia noted.  No signs of acute obstruction..              History     Patient presents the ER with complaints of abdominal pain and nausea.

## 2024-12-08 NOTE — ED TRIAGE NOTES
Patient is having upper right abdominal pain that started 2 days ago. No emesis but patient is nauseas. Patient has had diarrhea for 2 days last episode was this morning.

## 2024-12-09 NOTE — DISCHARGE INSTRUCTIONS
Take medication as prescribed  Follow-up with your primary care provider  We have placed referral with general surgery for you to follow-up with them as well concerning your hernia  Return to the ER for any new, worsening or life-threatening symptoms

## 2024-12-12 ENCOUNTER — PREP FOR PROCEDURE (OUTPATIENT)
Dept: SURGERY | Age: 44
End: 2024-12-12

## 2024-12-12 ENCOUNTER — OFFICE VISIT (OUTPATIENT)
Dept: SURGERY | Age: 44
End: 2024-12-12
Payer: COMMERCIAL

## 2024-12-12 VITALS
SYSTOLIC BLOOD PRESSURE: 141 MMHG | HEIGHT: 67 IN | HEART RATE: 106 BPM | WEIGHT: 293 LBS | BODY MASS INDEX: 45.99 KG/M2 | DIASTOLIC BLOOD PRESSURE: 86 MMHG

## 2024-12-12 DIAGNOSIS — K42.9 UMBILICAL HERNIA WITHOUT OBSTRUCTION AND WITHOUT GANGRENE: Primary | ICD-10-CM

## 2024-12-12 PROCEDURE — 99213 OFFICE O/P EST LOW 20 MIN: CPT | Performed by: SURGERY

## 2024-12-12 RX ORDER — TRAMADOL HYDROCHLORIDE 50 MG/1
50 TABLET ORAL EVERY 4 HOURS PRN
Qty: 18 TABLET | Refills: 0 | Status: SHIPPED | OUTPATIENT
Start: 2024-12-12 | End: 2024-12-15

## 2024-12-12 NOTE — H&P (VIEW-ONLY)
Negative NEG      Leukocyte Esterase, Urine Negative NEG      WBC, UA 0-4 U4 /hpf    RBC, UA 0-5 U5 /hpf    Epithelial Cells, UA 0-5 U5 /hpf    BACTERIA, URINE Negative NEG /hpf    Hyaline Casts, UA 0-2 /lpf       Imaging:     I have independently reviewed all pertinent imaging     CT ABDOMEN PELVIS W IV CONTRAST Additional Contrast? None  Narrative: EXAMINATION: CT ABDOMEN PELVIS W IV CONTRAST    EXAM DATE: 12/8/2024 6:37 PM    INDICATION: RUQ abdominal pain    COMPARISON: None    TECHNIQUE: Axial CT images were obtained of the abdomen and pelvis with  intravenous contrast. Multiplanar reconstructions were performed.     ABDOMEN/PELVIS FINDINGS:    Lower Chest: Unremarkable.    Liver: Normal enhancement and contour.    Biliary/Gallbladder: Prior cholecystectomy.    Pancreas: Unremarkable.    Spleen: Unremarkable.    Adrenal Glands: Unremarkable.    Kidneys: Unremarkable.    Gastrointestinal/Peritoneum: No acute abnormality. Mild colonic diverticulosis  is present. The appendix is unremarkable. No free air or free fluid.    Vascular: Unremarkable.    Lymph Nodes: No enlarged lymph nodes by CT size criteria.    Pelvic Organs: Prior hysterectomy. A small cyst in the right adnexa measures 1.9  cm and is likely in the right ovary and physiologic.    Bladder: Unremarkable.    Bones: No acute osseous abnormality.    Soft tissues: There is a small fat-containing umbilical hernia measuring 3.2 x  2.7 cm with mild surrounding hazy fat stranding. A fat-containing hernia in the  left inguinal region measures 4.6 x 3.0 cm.  Impression: 1.  Fat-containing umbilical hernia with mild associated hazy fat stranding.  Incarceration should be excluded clinically.    2.  Otherwise no acute findings of the abdomen and pelvis identified.    3.  Mild colonic diverticulosis.    4.  Prior cholecystectomy.    Electronically signed by Anamika [facility]             Assessment/Plan:  Charli Charles is a 43 y.o. female with a 4 cm  periumbilical hernia, incarcerated; electively scheduled for robotic periumbilical hernia repair with mesh      Coleman De La Garza MD  General and Robotic Surgery  12/12/2024 3:04 PM

## 2024-12-12 NOTE — PROGRESS NOTES
Negative NEG      Leukocyte Esterase, Urine Negative NEG      WBC, UA 0-4 U4 /hpf    RBC, UA 0-5 U5 /hpf    Epithelial Cells, UA 0-5 U5 /hpf    BACTERIA, URINE Negative NEG /hpf    Hyaline Casts, UA 0-2 /lpf       Imaging:     I have independently reviewed all pertinent imaging     CT ABDOMEN PELVIS W IV CONTRAST Additional Contrast? None  Narrative: EXAMINATION: CT ABDOMEN PELVIS W IV CONTRAST    EXAM DATE: 12/8/2024 6:37 PM    INDICATION: RUQ abdominal pain    COMPARISON: None    TECHNIQUE: Axial CT images were obtained of the abdomen and pelvis with  intravenous contrast. Multiplanar reconstructions were performed.     ABDOMEN/PELVIS FINDINGS:    Lower Chest: Unremarkable.    Liver: Normal enhancement and contour.    Biliary/Gallbladder: Prior cholecystectomy.    Pancreas: Unremarkable.    Spleen: Unremarkable.    Adrenal Glands: Unremarkable.    Kidneys: Unremarkable.    Gastrointestinal/Peritoneum: No acute abnormality. Mild colonic diverticulosis  is present. The appendix is unremarkable. No free air or free fluid.    Vascular: Unremarkable.    Lymph Nodes: No enlarged lymph nodes by CT size criteria.    Pelvic Organs: Prior hysterectomy. A small cyst in the right adnexa measures 1.9  cm and is likely in the right ovary and physiologic.    Bladder: Unremarkable.    Bones: No acute osseous abnormality.    Soft tissues: There is a small fat-containing umbilical hernia measuring 3.2 x  2.7 cm with mild surrounding hazy fat stranding. A fat-containing hernia in the  left inguinal region measures 4.6 x 3.0 cm.  Impression: 1.  Fat-containing umbilical hernia with mild associated hazy fat stranding.  Incarceration should be excluded clinically.    2.  Otherwise no acute findings of the abdomen and pelvis identified.    3.  Mild colonic diverticulosis.    4.  Prior cholecystectomy.    Electronically signed by Anamika [facility]             Assessment/Plan:  Charli Charles is a 43 y.o. female with a 4 cm

## 2024-12-16 RX ORDER — TRAMADOL HYDROCHLORIDE 50 MG/1
50 TABLET ORAL EVERY 6 HOURS PRN
COMMUNITY

## 2024-12-16 RX ORDER — ATORVASTATIN CALCIUM 20 MG/1
20 TABLET, FILM COATED ORAL NIGHTLY
COMMUNITY

## 2024-12-16 NOTE — PROGRESS NOTES
Patient verified name and .  Order for consent  was not found in EHR and unable to match consent with case posting; patient verifies procedure.   Type 3 surgery, phone assessment complete.  Orders not received.  Labs per surgeon: unknown, no orders  Labs per anesthesia protocol: CBC, BMP at outpt lab    Patient answered medical/surgical history questions at their best of ability. All prior to admission medications documented in EPIC.    Patient instructed to continue taking all prescription medications up to the day of surgery but to take only the following medications the day of surgery according to anesthesia guidelines with a small sip of water: USE/bring Albuterol nebulizer and inhaler.  Bring CPAP machine.   Also, patient is requested to take 2 Tylenol in the morning and then again before bed on the day before surgery. Regular or extra strength may be used.       Patient informed that all vitamins and supplements should be held 7 days prior to surgery and NSAIDS 5 days prior to surgery. Prescription meds to hold:Diclofenac and Toradol hold for 5 days prior to surgery    Patient instructed on the following:    > Arrive at A Entrance, time of arrival to be called the day before by 1700  > No food after midnight, unless otherwise indicated, including gum, mints, and ice chips; Please drink 32 ounces of non-caffeinated clear liquids 2 hours prior to your arrival to avoid dehydration.    > Responsible adult must drive patient to the hospital, stay during surgery, and patient will need supervision 24 hours after anesthesia  > Use non moisturizing soap in shower the night before surgery and on the morning of surgery  > All piercings must be removed prior to arrival.    > Leave all valuables (money and jewelry) at home but bring insurance card and ID on DOS.   > You may be required to pay a deductible or co-pay on the day of your procedure. You can pre-pay by calling [phone] if your surgery is at the [facility]

## 2024-12-17 ENCOUNTER — HOSPITAL ENCOUNTER (OUTPATIENT)
Dept: LAB | Age: 44
Discharge: HOME | End: 2024-12-20
Payer: COMMERCIAL

## 2024-12-17 DIAGNOSIS — Z01.818 PRE-OP TESTING: ICD-10-CM

## 2024-12-17 LAB
ANION GAP SERPL CALC-SCNC: 9 MMOL/L (ref 7–16)
BUN SERPL-MCNC: 10 MG/DL (ref 6–23)
CALCIUM SERPL-MCNC: 9.3 MG/DL (ref 8.8–10.2)
CHLORIDE SERPL-SCNC: 104 MMOL/L (ref 98–107)
CO2 SERPL-SCNC: 27 MMOL/L (ref 20–29)
CREAT SERPL-MCNC: 0.86 MG/DL (ref 0.6–1.1)
ERYTHROCYTE [DISTWIDTH] IN BLOOD BY AUTOMATED COUNT: 13.2 % (ref 11.9–14.6)
GLUCOSE SERPL-MCNC: 94 MG/DL (ref 70–99)
HCT VFR BLD AUTO: 40.9 % (ref 35.8–46.3)
HGB BLD-MCNC: 13.4 G/DL (ref 11.7–15.4)
MCH RBC QN AUTO: 27.9 PG (ref 26.1–32.9)
MCHC RBC AUTO-ENTMCNC: 32.8 G/DL (ref 31.4–35)
MCV RBC AUTO: 85.2 FL (ref 82–102)
NRBC # BLD: 0 K/UL (ref 0–0.2)
PLATELET # BLD AUTO: 315 K/UL (ref 150–450)
PMV BLD AUTO: 10.9 FL (ref 9.4–12.3)
POTASSIUM SERPL-SCNC: 4 MMOL/L (ref 3.5–5.1)
RBC # BLD AUTO: 4.8 M/UL (ref 4.05–5.2)
SODIUM SERPL-SCNC: 140 MMOL/L (ref 136–145)
WBC # BLD AUTO: 9.8 K/UL (ref 4.3–11.1)

## 2024-12-17 PROCEDURE — 80048 BASIC METABOLIC PNL TOTAL CA: CPT

## 2024-12-17 PROCEDURE — 85027 COMPLETE CBC AUTOMATED: CPT

## 2024-12-17 PROCEDURE — 36415 COLL VENOUS BLD VENIPUNCTURE: CPT

## 2024-12-19 ENCOUNTER — ANESTHESIA EVENT (OUTPATIENT)
Dept: SURGERY | Age: 44
End: 2024-12-19
Payer: COMMERCIAL

## 2024-12-20 ENCOUNTER — HOSPITAL ENCOUNTER (OUTPATIENT)
Age: 44
Setting detail: OUTPATIENT SURGERY
Discharge: HOME | End: 2024-12-20
Attending: SURGERY | Admitting: SURGERY
Payer: COMMERCIAL

## 2024-12-20 ENCOUNTER — ANESTHESIA (OUTPATIENT)
Dept: SURGERY | Age: 44
End: 2024-12-20
Payer: COMMERCIAL

## 2024-12-20 VITALS
WEIGHT: 293 LBS | SYSTOLIC BLOOD PRESSURE: 136 MMHG | TEMPERATURE: 98.6 F | OXYGEN SATURATION: 94 % | BODY MASS INDEX: 45.99 KG/M2 | RESPIRATION RATE: 12 BRPM | HEIGHT: 67 IN | HEART RATE: 106 BPM | DIASTOLIC BLOOD PRESSURE: 94 MMHG

## 2024-12-20 DIAGNOSIS — K42.9 UMBILICAL HERNIA WITHOUT OBSTRUCTION AND WITHOUT GANGRENE: Primary | ICD-10-CM

## 2024-12-20 DIAGNOSIS — Z01.818 PRE-OP TESTING: ICD-10-CM

## 2024-12-20 PROCEDURE — 6360000002 HC RX W HCPCS: Performed by: STUDENT IN AN ORGANIZED HEALTH CARE EDUCATION/TRAINING PROGRAM

## 2024-12-20 PROCEDURE — 2500000003 HC RX 250 WO HCPCS: Performed by: ANESTHESIOLOGY

## 2024-12-20 PROCEDURE — 3700000000 HC ANESTHESIA ATTENDED CARE: Performed by: SURGERY

## 2024-12-20 PROCEDURE — C1781 MESH (IMPLANTABLE): HCPCS | Performed by: SURGERY

## 2024-12-20 PROCEDURE — 7100000011 HC PHASE II RECOVERY - ADDTL 15 MIN: Performed by: SURGERY

## 2024-12-20 PROCEDURE — 6360000002 HC RX W HCPCS

## 2024-12-20 PROCEDURE — 3700000001 HC ADD 15 MINUTES (ANESTHESIA): Performed by: SURGERY

## 2024-12-20 PROCEDURE — 6370000000 HC RX 637 (ALT 250 FOR IP): Performed by: STUDENT IN AN ORGANIZED HEALTH CARE EDUCATION/TRAINING PROGRAM

## 2024-12-20 PROCEDURE — 7100000000 HC PACU RECOVERY - FIRST 15 MIN: Performed by: SURGERY

## 2024-12-20 PROCEDURE — 6360000002 HC RX W HCPCS: Performed by: NURSE ANESTHETIST, CERTIFIED REGISTERED

## 2024-12-20 PROCEDURE — S2900 ROBOTIC SURGICAL SYSTEM: HCPCS | Performed by: SURGERY

## 2024-12-20 PROCEDURE — 2500000003 HC RX 250 WO HCPCS: Performed by: SURGERY

## 2024-12-20 PROCEDURE — 2580000003 HC RX 258: Performed by: STUDENT IN AN ORGANIZED HEALTH CARE EDUCATION/TRAINING PROGRAM

## 2024-12-20 PROCEDURE — 6360000002 HC RX W HCPCS: Performed by: SURGERY

## 2024-12-20 PROCEDURE — 2500000003 HC RX 250 WO HCPCS

## 2024-12-20 PROCEDURE — C1889 IMPLANT/INSERT DEVICE, NOC: HCPCS | Performed by: SURGERY

## 2024-12-20 PROCEDURE — 7100000001 HC PACU RECOVERY - ADDTL 15 MIN: Performed by: SURGERY

## 2024-12-20 PROCEDURE — 3600000009 HC SURGERY ROBOT BASE: Performed by: SURGERY

## 2024-12-20 PROCEDURE — 7100000010 HC PHASE II RECOVERY - FIRST 15 MIN: Performed by: SURGERY

## 2024-12-20 PROCEDURE — 2709999900 HC NON-CHARGEABLE SUPPLY: Performed by: SURGERY

## 2024-12-20 PROCEDURE — 3600000019 HC SURGERY ROBOT ADDTL 15MIN: Performed by: SURGERY

## 2024-12-20 DEVICE — CLIP INT M L POLYMER LOK LIG HEM O LOK: Type: IMPLANTABLE DEVICE | Site: ABDOMEN | Status: FUNCTIONAL

## 2024-12-20 DEVICE — IMPLANTABLE DEVICE: Type: IMPLANTABLE DEVICE | Site: ABDOMEN | Status: FUNCTIONAL

## 2024-12-20 RX ORDER — KETAMINE HCL IN NACL, ISO-OSM 20 MG/2 ML
20 SYRINGE (ML) INJECTION ONCE
Status: COMPLETED | OUTPATIENT
Start: 2024-12-20 | End: 2024-12-20

## 2024-12-20 RX ORDER — PROCHLORPERAZINE EDISYLATE 5 MG/ML
5 INJECTION INTRAMUSCULAR; INTRAVENOUS
Status: DISCONTINUED | OUTPATIENT
Start: 2024-12-20 | End: 2024-12-20 | Stop reason: HOSPADM

## 2024-12-20 RX ORDER — SODIUM CHLORIDE 0.9 % (FLUSH) 0.9 %
5-40 SYRINGE (ML) INJECTION EVERY 12 HOURS SCHEDULED
Status: DISCONTINUED | OUTPATIENT
Start: 2024-12-20 | End: 2024-12-20 | Stop reason: HOSPADM

## 2024-12-20 RX ORDER — NALOXONE HYDROCHLORIDE 0.4 MG/ML
INJECTION, SOLUTION INTRAMUSCULAR; INTRAVENOUS; SUBCUTANEOUS PRN
Status: DISCONTINUED | OUTPATIENT
Start: 2024-12-20 | End: 2024-12-20 | Stop reason: HOSPADM

## 2024-12-20 RX ORDER — MIDAZOLAM HYDROCHLORIDE 2 MG/2ML
2 INJECTION, SOLUTION INTRAMUSCULAR; INTRAVENOUS
Status: COMPLETED | OUTPATIENT
Start: 2024-12-20 | End: 2024-12-20

## 2024-12-20 RX ORDER — NEOSTIGMINE METHYLSULFATE 1 MG/ML
INJECTION, SOLUTION INTRAVENOUS
Status: DISCONTINUED | OUTPATIENT
Start: 2024-12-20 | End: 2024-12-20 | Stop reason: SDUPTHER

## 2024-12-20 RX ORDER — SODIUM CHLORIDE 0.9 % (FLUSH) 0.9 %
5-40 SYRINGE (ML) INJECTION PRN
Status: DISCONTINUED | OUTPATIENT
Start: 2024-12-20 | End: 2024-12-20 | Stop reason: HOSPADM

## 2024-12-20 RX ORDER — TRAMADOL HYDROCHLORIDE 50 MG/1
50 TABLET ORAL EVERY 6 HOURS PRN
Qty: 12 TABLET | Refills: 0 | Status: SHIPPED | OUTPATIENT
Start: 2024-12-20 | End: 2024-12-23

## 2024-12-20 RX ORDER — FENTANYL CITRATE 50 UG/ML
100 INJECTION, SOLUTION INTRAMUSCULAR; INTRAVENOUS
Status: COMPLETED | OUTPATIENT
Start: 2024-12-20 | End: 2024-12-20

## 2024-12-20 RX ORDER — PROPOFOL 10 MG/ML
INJECTION, EMULSION INTRAVENOUS
Status: DISCONTINUED | OUTPATIENT
Start: 2024-12-20 | End: 2024-12-20 | Stop reason: SDUPTHER

## 2024-12-20 RX ORDER — LIDOCAINE HYDROCHLORIDE 20 MG/ML
INJECTION, SOLUTION EPIDURAL; INFILTRATION; INTRACAUDAL; PERINEURAL
Status: DISCONTINUED | OUTPATIENT
Start: 2024-12-20 | End: 2024-12-20 | Stop reason: SDUPTHER

## 2024-12-20 RX ORDER — ROCURONIUM BROMIDE 10 MG/ML
INJECTION, SOLUTION INTRAVENOUS
Status: DISCONTINUED | OUTPATIENT
Start: 2024-12-20 | End: 2024-12-20 | Stop reason: SDUPTHER

## 2024-12-20 RX ORDER — ONDANSETRON 2 MG/ML
4 INJECTION INTRAMUSCULAR; INTRAVENOUS
Status: DISCONTINUED | OUTPATIENT
Start: 2024-12-20 | End: 2024-12-20 | Stop reason: HOSPADM

## 2024-12-20 RX ORDER — ACETAMINOPHEN 500 MG
1000 TABLET ORAL EVERY 6 HOURS PRN
COMMUNITY

## 2024-12-20 RX ORDER — DEXAMETHASONE SODIUM PHOSPHATE 10 MG/ML
INJECTION INTRAMUSCULAR; INTRAVENOUS
Status: DISCONTINUED | OUTPATIENT
Start: 2024-12-20 | End: 2024-12-20 | Stop reason: SDUPTHER

## 2024-12-20 RX ORDER — SODIUM CHLORIDE 9 MG/ML
INJECTION, SOLUTION INTRAVENOUS PRN
Status: DISCONTINUED | OUTPATIENT
Start: 2024-12-20 | End: 2024-12-20 | Stop reason: HOSPADM

## 2024-12-20 RX ORDER — ONDANSETRON 4 MG/1
4 TABLET, ORALLY DISINTEGRATING ORAL 3 TIMES DAILY PRN
Qty: 21 TABLET | Refills: 0 | Status: SHIPPED | OUTPATIENT
Start: 2024-12-20

## 2024-12-20 RX ORDER — LIDOCAINE HYDROCHLORIDE 10 MG/ML
1 INJECTION, SOLUTION INFILTRATION; PERINEURAL
Status: DISCONTINUED | OUTPATIENT
Start: 2024-12-20 | End: 2024-12-20 | Stop reason: HOSPADM

## 2024-12-20 RX ORDER — KETAMINE HCL IN NACL, ISO-OSM 20 MG/2 ML
SYRINGE (ML) INJECTION
Status: DISCONTINUED | OUTPATIENT
Start: 2024-12-20 | End: 2024-12-20 | Stop reason: SDUPTHER

## 2024-12-20 RX ORDER — ONDANSETRON 4 MG/1
4 TABLET, ORALLY DISINTEGRATING ORAL EVERY 8 HOURS PRN
Status: DISCONTINUED | OUTPATIENT
Start: 2024-12-20 | End: 2024-12-20 | Stop reason: HOSPADM

## 2024-12-20 RX ORDER — OXYCODONE HYDROCHLORIDE 5 MG/1
5 TABLET ORAL
Status: COMPLETED | OUTPATIENT
Start: 2024-12-20 | End: 2024-12-20

## 2024-12-20 RX ORDER — ONDANSETRON 2 MG/ML
INJECTION INTRAMUSCULAR; INTRAVENOUS
Status: DISCONTINUED | OUTPATIENT
Start: 2024-12-20 | End: 2024-12-20 | Stop reason: SDUPTHER

## 2024-12-20 RX ORDER — SODIUM CHLORIDE, SODIUM LACTATE, POTASSIUM CHLORIDE, CALCIUM CHLORIDE 600; 310; 30; 20 MG/100ML; MG/100ML; MG/100ML; MG/100ML
INJECTION, SOLUTION INTRAVENOUS CONTINUOUS
Status: DISCONTINUED | OUTPATIENT
Start: 2024-12-20 | End: 2024-12-20 | Stop reason: HOSPADM

## 2024-12-20 RX ORDER — KETOROLAC TROMETHAMINE 30 MG/ML
INJECTION, SOLUTION INTRAMUSCULAR; INTRAVENOUS
Status: DISCONTINUED | OUTPATIENT
Start: 2024-12-20 | End: 2024-12-20 | Stop reason: SDUPTHER

## 2024-12-20 RX ORDER — BUPIVACAINE HYDROCHLORIDE AND EPINEPHRINE 5; 5 MG/ML; UG/ML
INJECTION, SOLUTION EPIDURAL; INTRACAUDAL; PERINEURAL PRN
Status: DISCONTINUED | OUTPATIENT
Start: 2024-12-20 | End: 2024-12-20 | Stop reason: ALTCHOICE

## 2024-12-20 RX ORDER — GLYCOPYRROLATE 0.2 MG/ML
INJECTION INTRAMUSCULAR; INTRAVENOUS
Status: DISCONTINUED | OUTPATIENT
Start: 2024-12-20 | End: 2024-12-20 | Stop reason: SDUPTHER

## 2024-12-20 RX ADMIN — LIDOCAINE HYDROCHLORIDE 100 MG: 20 INJECTION, SOLUTION EPIDURAL; INFILTRATION; INTRACAUDAL; PERINEURAL at 13:43

## 2024-12-20 RX ADMIN — ROCURONIUM BROMIDE 10 MG: 10 INJECTION, SOLUTION INTRAVENOUS at 14:53

## 2024-12-20 RX ADMIN — HYDROMORPHONE HYDROCHLORIDE 0.5 MG: 1 INJECTION, SOLUTION INTRAMUSCULAR; INTRAVENOUS; SUBCUTANEOUS at 16:24

## 2024-12-20 RX ADMIN — CEFAZOLIN 3000 MG: 2 INJECTION, POWDER, FOR SOLUTION INTRAMUSCULAR; INTRAVENOUS at 13:52

## 2024-12-20 RX ADMIN — HYDROMORPHONE HYDROCHLORIDE 0.5 MG: 1 INJECTION, SOLUTION INTRAMUSCULAR; INTRAVENOUS; SUBCUTANEOUS at 16:17

## 2024-12-20 RX ADMIN — KETOROLAC TROMETHAMINE 30 MG: 30 INJECTION, SOLUTION INTRAMUSCULAR; INTRAVENOUS at 15:39

## 2024-12-20 RX ADMIN — DEXAMETHASONE SODIUM PHOSPHATE 10 MG: 10 INJECTION INTRAMUSCULAR; INTRAVENOUS at 13:57

## 2024-12-20 RX ADMIN — ROCURONIUM BROMIDE 10 MG: 10 INJECTION, SOLUTION INTRAVENOUS at 15:23

## 2024-12-20 RX ADMIN — SODIUM CHLORIDE, SODIUM LACTATE, POTASSIUM CHLORIDE, AND CALCIUM CHLORIDE: 600; 310; 30; 20 INJECTION, SOLUTION INTRAVENOUS at 13:35

## 2024-12-20 RX ADMIN — ONDANSETRON 4 MG: 2 INJECTION INTRAMUSCULAR; INTRAVENOUS at 15:27

## 2024-12-20 RX ADMIN — FENTANYL CITRATE 100 MCG: 50 INJECTION, SOLUTION INTRAMUSCULAR; INTRAVENOUS at 13:43

## 2024-12-20 RX ADMIN — Medication 5 MG: at 15:37

## 2024-12-20 RX ADMIN — ROCURONIUM BROMIDE 50 MG: 10 INJECTION, SOLUTION INTRAVENOUS at 13:44

## 2024-12-20 RX ADMIN — ROCURONIUM BROMIDE 10 MG: 10 INJECTION, SOLUTION INTRAVENOUS at 14:38

## 2024-12-20 RX ADMIN — PROPOFOL 200 MG: 10 INJECTION, EMULSION INTRAVENOUS at 13:43

## 2024-12-20 RX ADMIN — Medication 20 MG: at 17:31

## 2024-12-20 RX ADMIN — GLYCOPYRROLATE 0.2 MG: 0.2 INJECTION INTRAMUSCULAR; INTRAVENOUS at 15:43

## 2024-12-20 RX ADMIN — GLYCOPYRROLATE 0.6 MG: 0.2 INJECTION INTRAMUSCULAR; INTRAVENOUS at 15:37

## 2024-12-20 RX ADMIN — Medication 20 MG: at 14:08

## 2024-12-20 RX ADMIN — OXYCODONE 5 MG: 5 TABLET ORAL at 16:43

## 2024-12-20 RX ADMIN — MIDAZOLAM HYDROCHLORIDE 2 MG: 1 INJECTION, SOLUTION INTRAMUSCULAR; INTRAVENOUS at 13:37

## 2024-12-20 ASSESSMENT — PAIN SCALES - GENERAL
PAINLEVEL_OUTOF10: 10
PAINLEVEL_OUTOF10: 3
PAINLEVEL_OUTOF10: 6
PAINLEVEL_OUTOF10: 8
PAINLEVEL_OUTOF10: 10
PAINLEVEL_OUTOF10: 6

## 2024-12-20 ASSESSMENT — PAIN DESCRIPTION - DESCRIPTORS: DESCRIPTORS: ACHING

## 2024-12-20 ASSESSMENT — PAIN - FUNCTIONAL ASSESSMENT: PAIN_FUNCTIONAL_ASSESSMENT: 0-10

## 2024-12-20 NOTE — ANESTHESIA POSTPROCEDURE EVALUATION
Department of Anesthesiology  Postprocedure Note    Patient: Charli Charles  MRN: [id number]  YOB: 1980  Date of evaluation: 12/20/2024    Procedure Summary       Date: 12/20/24 Room / Location: [facility]  / [facility]    Anesthesia Start: 1335 Anesthesia Stop: 1604    Procedure: HERNIA UMBILICAL REPAIR LAPAROSCOPIC ROBOTIC WITH MESH (Abdomen) Diagnosis:       Hernia, umbilical      (Hernia, umbilical [K42.9])    Surgeons: Coleman De La Garza MD Responsible Provider: Linh Ballard MD    Anesthesia Type: General ASA Status: 3            Anesthesia Type: General    James Phase I: James Score: 10    James Phase II: James Score: 10    Anesthesia Post Evaluation    Patient location during evaluation: PACU  Patient participation: complete - patient participated  Level of consciousness: awake and alert  Airway patency: patent  Nausea & Vomiting: no nausea and no vomiting  Cardiovascular status: hemodynamically stable  Respiratory status: acceptable, nonlabored ventilation and spontaneous ventilation  Hydration status: euvolemic  Comments: BP (!) 143/93   Pulse 98   Temp 98.6 °F (37 °C) (Temporal)   Resp 14   Ht 1.702 m (5' 7.01\")   Wt (!) 138.7 kg (305 lb 11.2 oz)   SpO2 96%   BMI 47.87 kg/m²     Multimodal analgesia pain management approach  Pain management: adequate and satisfactory to patient    No notable events documented.

## 2024-12-20 NOTE — OP NOTE
Operative Note      Patient: Charli Charles  YOB: 1980  MRN: [id number]    Date of Procedure: 12/20/2024    Pre-Op Diagnosis Codes:      * Hernia, umbilical [K42.9]    Post-Op Diagnosis: Same       Procedure(s):  HERNIA UMBILICAL REPAIR LAPAROSCOPIC ROBOTIC WITH MESH    Surgeon(s):  Coleman De La Garza MD    Assistant:   * No surgical staff found *    Anesthesia: General    Estimated Blood Loss (mL): Minimal    Complications: None    Specimens:   * No specimens in log *    Implants:  Implant Name Type Inv. Item Serial No.  Lot No. LRB No. Used Action   MESH JOSÉ ANTONIO 7.6CM POLY 4 HYDROXYBUTYRATE RND FULL RESRB SYN - [id number]  MESH JOSÉ ANTONIO 7.6CM POLY 4 HYDROXYBUTYRATE RND FULL RESRB SYN  BARD DAVOL-WD [id number] N/A 1 Implanted   CLIP INT M L POLYMER VANE LIG HEM O VANE - [id number]  CLIP INT M L POLYMER VANE LIG HEM O VANE  [employer]WD [id number] N/A 1 Implanted         Drains: * No LDAs found *    Findings:  Infection Present At Time Of Surgery (PATOS) (choose all levels that have infection present):  No infection present  Other Findings:     Detailed Description of Procedure: The patient was brought to the operating room, placed on the operating table in the supine position.  The patient is intubated endotracheally and placed under general anesthesia.  The abdomen is prepped and draped in a standard sterile fashion.  A 5 mm incision was made at Winters's point in the left upper quadrant allowing passage of a Veress needle into the peritoneal cavity and insufflation with CO2 gas to pressure of 10 mmHg.  Following adequate insufflation a 0 degree 5 mm scope was placed with a 5 mm Visiport directly into the peritoneal cavity.  This allows for visualization of the abdomen and placement of a left lateral 8 mm robotic port and a left lower quadrant robotic port.  This allows for placement of a 12 mm air seal port in the Winters's point incision.  An 8 mm robotic port is piggybacked in that

## 2024-12-20 NOTE — ANESTHESIA PRE PROCEDURE
Department of Anesthesiology  Preprocedure Note       Name:  Charli Charles   Age:  44 y.o.  :  1980                                          MRN:  [id number]         Date:  2024      Surgeon: Surgeon(s):  Coleman De La Garza MD    Procedure: Procedure(s):  HERNIA UMBILICAL REPAIR LAPAROSCOPIC ROBOTIC WITH MESH    Medications prior to admission:   Prior to Admission medications    Medication Sig Start Date End Date Taking? Authorizing Provider   acetaminophen (TYLENOL) 500 MG tablet Take 2 tablets by mouth every 6 hours as needed for Pain   Yes Provider, MD Ken   DICLOFENAC PO Take 75 mg by mouth as needed   Yes Provider, MD Ken   atorvastatin (LIPITOR) 20 MG tablet Take 1 tablet by mouth nightly   Yes Provider, MD Ken   traMADol (ULTRAM) 50 MG tablet Take 1 tablet by mouth every 6 hours as needed for Pain.   Yes Provider, MD Ken   hyoscyamine (ANASPAZ;LEVSIN) 0.125 MG tablet Take 1 tablet by mouth every 4 hours as needed for Cramping or Diarrhea 24  Yes Adan Tabares MD   Multiple Vitamins-Minerals (MULTIVITAMIN ADULTS PO) Take by mouth daily   Yes ProviderKen MD   Meloxicam (MOBIC PO) Take by mouth daily   Yes Provider, MD Ken   NONFORMULARY daily Magnalight supplement for Joint Pain   Yes Provider, MD Ken   albuterol (PROVENTIL) (5 MG/ML) 0.5% nebulizer solution Inhale 0.5 mLs into the lungs as needed   Yes Automatic Reconciliation, Ar   Cetirizine HCl 10 MG CAPS Take by mouth nightly    Yes Automatic Reconciliation, Ar   eletriptan (RELPAX) 40 MG tablet Talke one at onset of headache may repeat in 2 hours if necessary 6/10/21  Yes Automatic Reconciliation, Ar   eszopiclone (LUNESTA) 1 MG TABS Take 1 tablet by mouth nightly. 21  Yes Automatic Reconciliation, Ar   fluticasone (FLOVENT HFA) 220 MCG/ACT inhaler Inhale into the lungs as needed   Yes Automatic Reconciliation, Ar   ketorolac (TORADOL) 10 MG tablet Take 1 tablet by

## 2024-12-20 NOTE — DISCHARGE INSTRUCTIONS
HERNIA REPAIR    ACTIVITY  As tolerated and as directed by your doctor.  You may shower starting tomorrow but do not take a bath until released by your doctor.  Avoid lifting more than 5 pounds (pulling and straining). Avoid excessive use of stairs.  Take deep breathes and support incision with pillow when you cough.    DIET  Clear liquids until no nausea or vomiting; then light diet for the first day.  Advance to regular diet on second day, unless directed by your doctor.   If nausea or vomiting continues, call your doctor.   You may notice that your bowel movements are not regular right after your surgery. This is common. Try to avoid constipation and straining with bowel movements. You may want to take a fiber supplement every day (Miralax). If you have not had a bowel movement after a couple of days, ask your doctor about taking a mild laxative.    MEDICATION INTERACTION:  During your procedure you potentially received a medication or medications which may reduce the effectiveness of oral contraceptives. Please consider other forms of contraception for 1 month following your procedure if you are currently using oral contraceptives as your primary form of birth control. In addition to this, we recommend continuing your oral contraceptive as prescribed, unless otherwise instructed by your physician, during this time.    CALL YOUR DOCTOR IF   Excessive bleeding that does not stop after holding pressure over the area.  Temperature of 101 degrees F or above.   Redness, excessive swelling or bruising, and/ or green or yellow, smelly discharge from incision.     After general anesthesia or intravenous sedation, for 24 hours or while taking prescription Narcotics:  Limit your activities  A responsible adult needs to be with you for the next 24 hours  Do not drive and operate hazardous machinery  Do not make important personal or business decisions  Do not drink alcoholic beverages  If you have not urinated within 8

## 2024-12-26 ENCOUNTER — TELEPHONE (OUTPATIENT)
Dept: SURGERY | Age: 44
End: 2024-12-26

## 2024-12-26 NOTE — TELEPHONE ENCOUNTER
Pt called to report a rash to her abdomen. She has tried OTC topical Benadryl cream and pills by mouth without relief. I suggested the ER for faster relief. She voiced understanding.

## 2025-01-02 ENCOUNTER — OFFICE VISIT (OUTPATIENT)
Dept: SURGERY | Age: 45
End: 2025-01-02

## 2025-01-02 VITALS
BODY MASS INDEX: 45.99 KG/M2 | DIASTOLIC BLOOD PRESSURE: 89 MMHG | WEIGHT: 293 LBS | HEART RATE: 98 BPM | HEIGHT: 67 IN | SYSTOLIC BLOOD PRESSURE: 145 MMHG

## 2025-01-02 DIAGNOSIS — K42.9 UMBILICAL HERNIA WITHOUT OBSTRUCTION AND WITHOUT GANGRENE: Primary | ICD-10-CM

## 2025-01-02 DIAGNOSIS — L30.9 DERMATITIS: ICD-10-CM

## 2025-01-02 DIAGNOSIS — L50.9 HIVES: Primary | ICD-10-CM

## 2025-01-02 RX ORDER — HYDROXYZINE HYDROCHLORIDE 50 MG/1
TABLET, FILM COATED ORAL
COMMUNITY
Start: 2024-12-26

## 2025-01-02 NOTE — PROGRESS NOTES
acute findings of the abdomen and pelvis identified.    3.  Mild colonic diverticulosis.    4.  Prior cholecystectomy.    Electronically signed by Teodora Baron        Diagnosis Orders   1. Umbilical hernia without obstruction and without gangrene              Assessment/Plan:  Charli Charles is a 44 y.o. female status post robotic umbilical hernia repair with mesh; now with a skin reaction that appears to be allergic in nature.  We will refer her to an allergist for a more in-depth workup.      Coleman De La Garza MD  General and Robotic Surgery  1/2/2025 3:31 PM

## 2025-01-17 ENCOUNTER — OFFICE VISIT (OUTPATIENT)
Dept: SURGERY | Age: 45
End: 2025-01-17

## 2025-01-17 DIAGNOSIS — K81.1 CHOLECYSTITIS, CHRONIC: Primary | ICD-10-CM

## 2025-01-17 PROCEDURE — 99024 POSTOP FOLLOW-UP VISIT: CPT | Performed by: SURGERY

## 2025-01-22 NOTE — PROGRESS NOTES
Medications   Medication Sig Dispense Refill    hydrOXYzine HCl (ATARAX) 50 MG tablet TAKE 1 TABLET BY MOUTH 4 TIMES DAILY AS NEEDED FOR ANXIETY      acetaminophen (TYLENOL) 500 MG tablet Take 2 tablets by mouth every 6 hours as needed for Pain      ondansetron (ZOFRAN-ODT) 4 MG disintegrating tablet Take 1 tablet by mouth 3 times daily as needed for Nausea or Vomiting 21 tablet 0    DICLOFENAC PO Take 75 mg by mouth as needed      atorvastatin (LIPITOR) 20 MG tablet Take 1 tablet by mouth nightly      traMADol (ULTRAM) 50 MG tablet Take 1 tablet by mouth every 6 hours as needed for Pain.      ondansetron (ZOFRAN-ODT) 8 MG TBDP disintegrating tablet Take 1 tablet by mouth every 8 hours as needed for Nausea or Vomiting 12 tablet 0    hyoscyamine (ANASPAZ;LEVSIN) 0.125 MG tablet Take 1 tablet by mouth every 4 hours as needed for Cramping or Diarrhea 20 tablet 0    Sodium Chloride Flush (SALINE FLUSH) 0.9 % SOLN Pre filled Normal saline syringe flushes 10ml  Provide approximately 20 or 1 box  To be used for dressing changes daily as directed 10 mL 0    Multiple Vitamins-Minerals (MULTIVITAMIN ADULTS PO) Take by mouth daily      Meloxicam (MOBIC PO) Take by mouth daily      NONFORMULARY daily Magnalight supplement for Joint Pain      albuterol sulfate  (90 Base) MCG/ACT inhaler Inhale into the lungs as needed       albuterol (PROVENTIL) (5 MG/ML) 0.5% nebulizer solution Inhale 0.5 mLs into the lungs as needed      Cetirizine HCl 10 MG CAPS Take by mouth nightly       eletriptan (RELPAX) 40 MG tablet Talke one at onset of headache may repeat in 2 hours if necessary      eszopiclone (LUNESTA) 1 MG TABS Take 1 tablet by mouth nightly.      fluticasone (FLOVENT HFA) 220 MCG/ACT inhaler Inhale into the lungs as needed      ketorolac (TORADOL) 10 MG tablet Take 1 tablet by mouth as needed (for migraines)      montelukast (SINGULAIR) 10 MG tablet Take 1 tablet by mouth nightly      omeprazole (PRILOSEC OTC) 20 MG tablet

## 2025-01-30 ENCOUNTER — OFFICE VISIT (OUTPATIENT)
Dept: SURGERY | Age: 45
End: 2025-01-30

## 2025-01-30 VITALS
WEIGHT: 293 LBS | HEART RATE: 101 BPM | HEIGHT: 67 IN | SYSTOLIC BLOOD PRESSURE: 158 MMHG | BODY MASS INDEX: 45.99 KG/M2 | DIASTOLIC BLOOD PRESSURE: 94 MMHG

## 2025-01-30 DIAGNOSIS — K42.9 UMBILICAL HERNIA WITHOUT OBSTRUCTION AND WITHOUT GANGRENE: Primary | ICD-10-CM

## 2025-01-30 NOTE — PROGRESS NOTES
dry.      Capillary Refill: Capillary refill takes less than 2 seconds.   Neurological:      General: No focal deficit present.      Mental Status: She is alert and oriented to person, place, and time.          BP (!) 158/94   Pulse (!) 101   Ht 1.702 m (5' 7\")   Wt (!) 140.6 kg (310 lb)   BMI 48.55 kg/m²       Labs:     No results found for this or any previous visit (from the past 672 hour(s)).    Imaging:     I have independently reviewed all pertinent imaging     CT ABDOMEN PELVIS W IV CONTRAST Additional Contrast? None  Narrative: EXAMINATION: CT ABDOMEN PELVIS W IV CONTRAST    EXAM DATE: 12/8/2024 6:37 PM    INDICATION: RUQ abdominal pain    COMPARISON: None    TECHNIQUE: Axial CT images were obtained of the abdomen and pelvis with  intravenous contrast. Multiplanar reconstructions were performed.     ABDOMEN/PELVIS FINDINGS:    Lower Chest: Unremarkable.    Liver: Normal enhancement and contour.    Biliary/Gallbladder: Prior cholecystectomy.    Pancreas: Unremarkable.    Spleen: Unremarkable.    Adrenal Glands: Unremarkable.    Kidneys: Unremarkable.    Gastrointestinal/Peritoneum: No acute abnormality. Mild colonic diverticulosis  is present. The appendix is unremarkable. No free air or free fluid.    Vascular: Unremarkable.    Lymph Nodes: No enlarged lymph nodes by CT size criteria.    Pelvic Organs: Prior hysterectomy. A small cyst in the right adnexa measures 1.9  cm and is likely in the right ovary and physiologic.    Bladder: Unremarkable.    Bones: No acute osseous abnormality.    Soft tissues: There is a small fat-containing umbilical hernia measuring 3.2 x  2.7 cm with mild surrounding hazy fat stranding. A fat-containing hernia in the  left inguinal region measures 4.6 x 3.0 cm.  Impression: 1.  Fat-containing umbilical hernia with mild associated hazy fat stranding.  Incarceration should be excluded clinically.    2.  Otherwise no acute findings of the abdomen and pelvis identified.    3.

## 2025-06-17 ENCOUNTER — OFFICE VISIT (OUTPATIENT)
Dept: SURGERY | Age: 45
End: 2025-06-17
Payer: COMMERCIAL

## 2025-06-17 DIAGNOSIS — K42.9 UMBILICAL HERNIA WITHOUT OBSTRUCTION AND WITHOUT GANGRENE: ICD-10-CM

## 2025-06-17 DIAGNOSIS — R10.13 EPIGASTRIC PAIN: Primary | ICD-10-CM

## 2025-06-17 PROCEDURE — 99213 OFFICE O/P EST LOW 20 MIN: CPT

## 2025-06-17 NOTE — PROGRESS NOTES
poDate: 2025      Name: Charli Charles      MRN: [id number]       : 1980       Age: 44 y.o.    Sex: female        Marquis Malagon Jr., MD       CC:  No chief complaint on file.      HPI:  The patient presents for a post-op visit s/p R.  She presents to our office for complaints of abdominal pain.  Her procedure was on 2024 and she has had return of pain 4 days ago.  She complains of pain that waxes and wanes in the epigastric region of the abdomen as well as some nausea.  She denies any vomiting.  No fevers or chills.  She is passing gas, however she does complain of some constipation.  She went to an urgent care recently and they did an x-ray that did not have any abnormalities.  She denies any other associated symptoms.  She is otherwise doing well       Physical Exam:     There were no vitals taken for this visit.    General: Alert, oriented, cooperative in no acute distress.     Neck: Supple, trachea midline, no appreciable thyromegaly  Resp: Breathing is  non-labored. Lungs clear to auscultation without wheezing or rhonchi   CV: RRR. No murmurs, rubs or gallops appreciated.  Abd: Soft nondistended.  Epigastric tenderness that radiates laterally in both directions.  No obvious masses or hernias  Skin/incision: No evidence of infection.      Assessment/Plan:  Charli Charles is a 44 y.o. female who is s/p UHR here for abdominal pain.    Patient returns to the office with complaint of 4 days of abdominal pain.  She had a hernia repair in 2024.  She reports that this pain is in approximately the same area.  She reports that she felt the pain at work originally and went to an urgent care.  They did an x-ray that did not reveal any abnormalities.  She presents today with continued pain that waxes and wanes.  She denies any fevers or chills.  She has some nausea without any vomiting.  She is passing gas.  No evidence of any masses or hernias on exam.  Will order an outpatient CT

## 2025-06-20 ENCOUNTER — HOSPITAL ENCOUNTER (OUTPATIENT)
Dept: CT IMAGING | Age: 45
Discharge: HOME | End: 2025-06-23
Payer: COMMERCIAL

## 2025-06-20 DIAGNOSIS — R10.13 EPIGASTRIC PAIN: ICD-10-CM

## 2025-06-20 LAB — CREAT BLD-MCNC: 0.76 MG/DL (ref 0.8–1.5)

## 2025-06-20 PROCEDURE — 74177 CT ABD & PELVIS W/CONTRAST: CPT

## 2025-06-20 PROCEDURE — 6360000004 HC RX CONTRAST MEDICATION

## 2025-06-20 PROCEDURE — 82565 ASSAY OF CREATININE: CPT

## 2025-06-20 RX ORDER — IOPAMIDOL 755 MG/ML
100 INJECTION, SOLUTION INTRAVASCULAR
Status: COMPLETED | OUTPATIENT
Start: 2025-06-20 | End: 2025-06-20

## 2025-06-20 RX ADMIN — IOPAMIDOL 100 ML: 755 INJECTION, SOLUTION INTRAVENOUS at 14:26

## 2025-06-23 ENCOUNTER — TELEPHONE (OUTPATIENT)
Dept: SURGERY | Age: 45
End: 2025-06-23

## 2025-06-23 NOTE — TELEPHONE ENCOUNTER
Called patient to let her know what Adan said. Patient was still concerned, so I scheduled her an appointment with Dr. De La Garza.

## 2025-06-26 ENCOUNTER — OFFICE VISIT (OUTPATIENT)
Dept: SURGERY | Age: 45
End: 2025-06-26
Payer: COMMERCIAL

## 2025-06-26 VITALS
HEART RATE: 94 BPM | BODY MASS INDEX: 45.99 KG/M2 | SYSTOLIC BLOOD PRESSURE: 154 MMHG | WEIGHT: 293 LBS | DIASTOLIC BLOOD PRESSURE: 94 MMHG | HEIGHT: 67 IN

## 2025-06-26 DIAGNOSIS — K42.9 UMBILICAL HERNIA WITHOUT OBSTRUCTION AND WITHOUT GANGRENE: Primary | ICD-10-CM

## 2025-06-26 PROCEDURE — 99213 OFFICE O/P EST LOW 20 MIN: CPT | Performed by: SURGERY

## 2025-06-26 NOTE — PROGRESS NOTES
Coleman De La Garza MD   General and Robotic surgery  [address], [address]  [place] [address]  Phone [phone]   Fax [phone]      Date of visit: 2025      Primary/Requesting provider: Marquis Malagon Jr., MD         Name: Charli Charles      MRN: [id number]       : 1980       Age: 44 y.o.    Sex: female        PCP: Marquis Malagon Jr., MD     CC:    Chief Complaint   Patient presents with    Follow-up     FOLLOW UP - follow up-24-[facility] (lvm 25, Holli out of office)         HPI:     Charli Charles is a 44 y.o. female with a week and a half history of centralized abdominal pain with pain radiating bilaterally.  She last had umbilical hernia repair approximately 6 months ago and has not had issues during that time..  CT scan reveals no evidence of recurrence, just a 5 to 10 mL seroma.  There is no evidence of recurrence or other injury.  I have tried to assure her that I do not see any recurrence or seroma that needs to be addressed.  She is going to take it easy without any heavy lifting or heavy exertional activities.  I will see her back in a month.      Past Medical History:   Diagnosis Date    Arthritis     osteo    Asthma     last attack 10/2018; followed by pulmonary    Chronic pain     GERD (gastroesophageal reflux disease)     controlled with medication    GERD (gastroesophageal reflux disease)     Hyperlipidemia     Insomnia     Low back pain     Migraines     Morbid obesity (HCC)     BMI 46    Otitis media     PONV (postoperative nausea and vomiting)     small amount of Nausea post-op    Seasonal allergies     Sleep apnea     complaint with CPAP        Past Surgical History:   Procedure Laterality Date    BREAST REDUCTION SURGERY Bilateral 4/15/2019    BILATERAL BREAST REDUCTION performed by Milind Swanson MD at [facility] MAIN OR    CHOLECYSTECTOMY  2015    SKIN BIOPSY N/A 2022    MASS EXCISION OF BACK  x 5/PRONE performed by Ted Shaikh MD

## 2025-07-22 ENCOUNTER — TELEPHONE (OUTPATIENT)
Dept: SURGERY | Age: 45
End: 2025-07-22

## 2025-07-22 NOTE — TELEPHONE ENCOUNTER
Patient canceled their appointment on 07/24/2025 via [facility]. Called patient to reschedule their appointment on 07/24/2025. Patient didn't answer and I was not able to leave a voicemail.

## (undated) DEVICE — SYRINGE MED 10ML TRNSLUC BRL PLUNG BLK MRK POLYPR CTRL

## (undated) DEVICE — LIQUIBAND RAPID ADHESIVE 36/CS 0.8ML: Brand: MEDLINE

## (undated) DEVICE — ELECTRODE NDL 2.8IN COAT VALLEYLAB

## (undated) DEVICE — TRI-LUMEN FILTERED TUBE SET WITH ACTIVATED CHARCOAL FILTER: Brand: AIRSEAL

## (undated) DEVICE — REM POLYHESIVE ADULT PATIENT RETURN ELECTRODE: Brand: VALLEYLAB

## (undated) DEVICE — GOWN,REINF,POLY,ECL,PP SLV,XL: Brand: MEDLINE

## (undated) DEVICE — SLIM BODY SKIN STAPLER: Brand: APPOSE ULC

## (undated) DEVICE — (D)PREP SKN CHLRAPRP APPL 26ML -- CONVERT TO ITEM 371833

## (undated) DEVICE — GLOVE SURG SZ 8 CRM LTX FREE POLYISOPRENE POLYMER BEAD ANTI

## (undated) DEVICE — SUTURE STRATAFIX SPRL SZ 4-0 L5IN ABSRB UD FS-2 L19MM 3/8 SXMP2B407

## (undated) DEVICE — SUTURE STRATAFIX SPRL SZ 2-0 L9IN ABSRB VLT MH L36MM 1/2 SXPD2B408

## (undated) DEVICE — SUTURE STRATAFIX SZ 0 L12IN ABSRB VIO CT-1 L36MM SLV TAPR PT SXPP1A433

## (undated) DEVICE — SOLUTION ANTIFOG VIS SYS CLEARIFY LAPSCP

## (undated) DEVICE — MINOR SPLIT GENERAL: Brand: MEDLINE INDUSTRIES, INC.

## (undated) DEVICE — BLADELESS OBTURATOR: Brand: WECK VISTA

## (undated) DEVICE — ROBOTIC GENERAL-SFMC: Brand: MEDLINE INDUSTRIES, INC.

## (undated) DEVICE — KENDALL SCD EXPRESS SLEEVES, KNEE LENGTH, MEDIUM: Brand: KENDALL SCD

## (undated) DEVICE — INSUFFLATION NEEDLE TO ESTABLISH PNEUMOPERITONEUM.: Brand: INSUFFLATION NEEDLE

## (undated) DEVICE — CONTAINER SPEC HISTOLOGY 900ML POLYPR

## (undated) DEVICE — OCCLUSIVE GAUZE STRIP,3% BISMUTH TRIBROMOPHENATE IN PETROLATUM BLEND: Brand: XEROFORM

## (undated) DEVICE — DRAPE TWL SURG 16X26IN BLU ORB04] ALLCARE INC]

## (undated) DEVICE — DRAPE,UNDERBUTTOCKS,PCH,STERILE: Brand: MEDLINE

## (undated) DEVICE — PAD PT POS 36 IN SURGYPAD DISP

## (undated) DEVICE — ARM DRAPE

## (undated) DEVICE — PREMIUM WET SKIN PREP TRAY: Brand: MEDLINE INDUSTRIES, INC.

## (undated) DEVICE — 2000CC GUARDIAN II: Brand: GUARDIAN

## (undated) DEVICE — SOLUTION IV 1000ML 0.9% SOD CHL

## (undated) DEVICE — SUTURE VICRYL + SZ 3-0 L27IN ABSRB UD L26MM SH 1/2 CIR VCP416H

## (undated) DEVICE — AMD ANTIMICROBIAL BANDAGE ROLL,6 PLY: Brand: KERLIX

## (undated) DEVICE — SUTURE NONABSORBABLE MONOFILAMENT 5-0 PS-2 18 IN BLK ETHILON 1666H

## (undated) DEVICE — GLOVE SURG SZ 65 THK91MIL LTX FREE SYN POLYISOPRENE

## (undated) DEVICE — COLUMN DRAPE

## (undated) DEVICE — BINDER ABD M/L H12IN FOR 46-62IN WHT 4 SLD PNL DSGN HOOP

## (undated) DEVICE — PAD,NON-ADHERENT,3X8,STERILE,LF,1/PK: Brand: MEDLINE

## (undated) DEVICE — SUTURE MCRYL SZ 3-0 L27IN ABSRB UD L19MM PS-2 3/8 CIR PRIM Y427H

## (undated) DEVICE — SUTURE MONOCRYL SZ 4-0 L27IN ABSRB UD L19MM PS-2 1/2 CIR PRIM Y426H

## (undated) DEVICE — SUTURE ABSORBABLE ANTIBACT 2-0 CT-2 9 IN STRATAFIX PDS + SXPP1A422

## (undated) DEVICE — SUTURE VCRL SZ 4-0 L27IN ABSRB UD L19MM PS-2 3/8 CIR PRIM J426H

## (undated) DEVICE — GLOVE SURG SZ 7 L12IN FNGR THK79MIL GRN LTX FREE

## (undated) DEVICE — BLADE SURG L6.25IN LNG FOR BRAITHWAITE SKIN GRFT KNIVE

## (undated) DEVICE — BUTTON SWITCH PENCIL BLADE ELECTRODE, HOLSTER: Brand: EDGE

## (undated) DEVICE — DRAPE,CHEST,FENES,15X10,STERIL: Brand: MEDLINE

## (undated) DEVICE — PLASMABLADE PS300-004 SUCT COAG BLA 16PK: Brand: PLASMABLADE™

## (undated) DEVICE — NEEDLE HYPO 21GA L1.5IN GRN POLYPR HUB S STL THN WALL IV

## (undated) DEVICE — SOLUTION IRRIG 1000ML 09% SOD CHL USP PIC PLAS CONTAINER

## (undated) DEVICE — 3M™ TEGADERM™ TRANSPARENT FILM DRESSING FRAME STYLE, 1626W, 4 IN X 4-3/4 IN (10 CM X 12 CM), 50/CT 4CT/CASE: Brand: 3M™ TEGADERM™

## (undated) DEVICE — STERILE HOOK LOCK LATEX FREE ELASTIC BANDAGE 4INX5YD: Brand: HOOK LOCK™

## (undated) DEVICE — SUTURE VCRL SZ 3-0 L18IN ABSRB UD L26MM SH 1/2 CIR J864D

## (undated) DEVICE — 3M™ TEGADERM™ TRANSPARENT FILM DRESSING FRAME STYLE, 1626, 4 IN X 4-3/4 IN (10 CM X 12 CM), 50/CT 4CT/CASE: Brand: 3M™ TEGADERM™

## (undated) DEVICE — SUTURE ABSORBABLE MONOFILAMENT 4-0 V4 19 IN STRATAFIX SPRL SXPD2B426

## (undated) DEVICE — TIP COVER ACCESSORY

## (undated) DEVICE — PLASMABLADE PS210-030S 3.0S LOCK: Brand: PLASMABLADE™

## (undated) DEVICE — 2T9 -0- PDO 24 X 24: Brand: 2T9 -0- PDO 24 X 24

## (undated) DEVICE — SURGICAL PROCEDURE PACK BASIC ST FRANCIS

## (undated) DEVICE — GARMENT,MEDLINE,DVT,INT,CALF,LG, GEN2: Brand: MEDLINE

## (undated) DEVICE — SEAL

## (undated) DEVICE — SPONGE: SPECIALTY TONSIL XR MED 100/CS: Brand: MEDICAL ACTION INDUSTRIES

## (undated) DEVICE — SUTURE PROL SZ 0 L30IN NONABSORBABLE BLU FSLX L36MM 3/8 CIR 8690H

## (undated) DEVICE — TROCAR: Brand: KII FIOS FIRST ENTRY

## (undated) DEVICE — INTENDED FOR TISSUE SEPARATION, AND OTHER PROCEDURES THAT REQUIRE A SHARP SURGICAL BLADE TO PUNCTURE OR CUT.: Brand: BARD-PARKER SAFETY BLADES SIZE 15, STERILE

## (undated) DEVICE — STRIP,CLOSURE,WOUND,MEDI-STRIP,1/2X4: Brand: MEDLINE

## (undated) DEVICE — SPONGE LAP 18X18IN STRL -- 5/PK